# Patient Record
Sex: MALE | Race: WHITE | NOT HISPANIC OR LATINO | Employment: FULL TIME | URBAN - METROPOLITAN AREA
[De-identification: names, ages, dates, MRNs, and addresses within clinical notes are randomized per-mention and may not be internally consistent; named-entity substitution may affect disease eponyms.]

---

## 2018-09-12 ENCOUNTER — OFFICE VISIT (OUTPATIENT)
Dept: FAMILY MEDICINE CLINIC | Facility: CLINIC | Age: 33
End: 2018-09-12
Payer: COMMERCIAL

## 2018-09-12 VITALS
RESPIRATION RATE: 16 BRPM | SYSTOLIC BLOOD PRESSURE: 124 MMHG | HEIGHT: 70 IN | DIASTOLIC BLOOD PRESSURE: 80 MMHG | HEART RATE: 72 BPM | BODY MASS INDEX: 21.05 KG/M2 | TEMPERATURE: 97.8 F | WEIGHT: 147 LBS

## 2018-09-12 DIAGNOSIS — K52.9 CHRONIC DIARRHEA: Primary | ICD-10-CM

## 2018-09-12 DIAGNOSIS — R68.83 CHILLS: ICD-10-CM

## 2018-09-12 DIAGNOSIS — R68.89 GENERALLY UNWELL: ICD-10-CM

## 2018-09-12 DIAGNOSIS — R10.84 GENERALIZED ABDOMINAL PAIN: ICD-10-CM

## 2018-09-12 PROCEDURE — 99203 OFFICE O/P NEW LOW 30 MIN: CPT | Performed by: NURSE PRACTITIONER

## 2018-09-12 RX ORDER — OMEPRAZOLE 40 MG/1
40 CAPSULE, DELAYED RELEASE ORAL DAILY
Qty: 14 CAPSULE | Refills: 0 | Status: SHIPPED | OUTPATIENT
Start: 2018-09-12 | End: 2022-01-04

## 2018-09-12 RX ORDER — CYCLOSPORINE 0.5 MG/ML
1 EMULSION OPHTHALMIC 2 TIMES DAILY
COMMUNITY

## 2018-09-12 RX ORDER — CIPROFLOXACIN 500 MG/1
500 TABLET, FILM COATED ORAL EVERY 12 HOURS SCHEDULED
Qty: 14 TABLET | Refills: 0 | Status: SHIPPED | OUTPATIENT
Start: 2018-09-12 | End: 2018-09-18 | Stop reason: ALTCHOICE

## 2018-09-12 RX ORDER — METRONIDAZOLE 500 MG/1
500 TABLET ORAL EVERY 8 HOURS SCHEDULED
Qty: 21 TABLET | Refills: 0 | Status: SHIPPED | OUTPATIENT
Start: 2018-09-12 | End: 2018-09-19

## 2018-09-12 NOTE — PROGRESS NOTES
Assessment/Plan:    Problem List Items Addressed This Visit     None      Visit Diagnoses     Chronic diarrhea    -  Primary    Relevant Medications    ciprofloxacin (CIPRO) 500 mg tablet    metroNIDAZOLE (FLAGYL) 500 mg tablet    Other Relevant Orders    Stool Enteric Bacterial Panel by PCR    H  pylori antigen, stool    Clostridium difficile toxin by PCR    Generally unwell        Chills        Generalized abdominal pain        Relevant Medications    ciprofloxacin (CIPRO) 500 mg tablet    metroNIDAZOLE (FLAGYL) 500 mg tablet    omeprazole (PriLOSEC) 40 MG capsule        Exam unrevealing  rto for recheck in one week    I have reviewed the  instructions with the patient, answering all questions to his satisfaction  Patient Instructions   Abdominal Pain   AMBULATORY CARE:   Abdominal pain  can be dull, achy, or sharp  You may have pain in one area of your abdomen, or in your entire abdomen  Your pain may be caused by a condition such as constipation, food sensitivity or poisoning, infection, or a blockage  Abdominal pain can also be from a hernia, appendicitis, or an ulcer  Liver, gallbladder, or kidney conditions can also cause abdominal pain  The cause of your abdominal pain may be unknown  Seek care immediately if:   · You have new chest pain or shortness of breath  · You have pulsing pain in your upper abdomen or lower back that suddenly becomes constant  · Your pain is in the right lower abdominal area and worsens with movement  · You have a fever over 100 4°F (38°C) or shaking chills  · You are vomiting and cannot keep food or liquids down  · Your pain does not improve or gets worse over the next 8 to 12 hours  · You see blood in your vomit or bowel movements, or they look black and tarry  · Your skin or the whites of your eyes turn yellow  · You are a woman and have a large amount of vaginal bleeding that is not your monthly period    Contact your healthcare provider if: · You have pain in your lower back  · You are a man and have pain in your testicles  · You have pain when you urinate  · You have questions or concerns about your condition or care  Treatment for abdominal pain  may include medicine to calm your stomach, prevent vomiting, or decrease pain  Follow up with your healthcare provider as directed:  Write down your questions so you remember to ask them during your visits  © 2017 [facility] Information is for End User's use only and may not be sold, redistributed or otherwise used for commercial purposes  All illustrations and images included in CareNotes® are the copyrighted property of A D [employer] M [employer]  or Miguelangel Malik  The above information is an  only  It is not intended as medical advice for individual conditions or treatments  Talk to your doctor, nurse or pharmacist before following any medical regimen to see if it is safe and effective for you  Return in about 1 week (around 9/19/2018)  Subjective:      Patient ID: Rodrigo Brown is a 35 y o  male  Chief Complaint   Patient presents with    Diarrhea     patient reports sxs x 3 weeks since vacation in [facility], [place]       35year old male with no significant history presents with ongoing diarrhea since a trip to [facility], [place] one month ago  Diarrhea several times daily  Watery, brown, not especially foul smelling, no blood  No recent antibiotics  No one else on trip with similar sxs   Denies eating raw or suspicious foods  Cut out alcohol, dairy, caffeine from diet--not helping  Experiences chills from time to time  Feels fatigued  Generally burning in abdomen, denies bloating, nausea, vomiting  Other diet modifications not taking any meds  No h/o c diff, recent antibiotics          The following portions of the patient's history were reviewed and updated as appropriate: allergies, current medications, past family history, past medical history, past social history, past surgical history and problem list     Review of Systems   Constitutional: Positive for chills, fatigue and fever  Negative for unexpected weight change  HENT: Negative  Respiratory: Negative  Cardiovascular: Negative  Gastrointestinal:        Per hpi   Musculoskeletal: Negative for arthralgias and myalgias  Skin: Negative for rash  Neurological: Negative  Current Outpatient Prescriptions   Medication Sig Dispense Refill    cycloSPORINE (RESTASIS) 0 05 % ophthalmic emulsion 1 drop 2 (two) times a day      ciprofloxacin (CIPRO) 500 mg tablet Take 1 tablet (500 mg total) by mouth every 12 (twelve) hours for 7 days 14 tablet 0    metroNIDAZOLE (FLAGYL) 500 mg tablet Take 1 tablet (500 mg total) by mouth every 8 (eight) hours for 7 days 21 tablet 0    omeprazole (PriLOSEC) 40 MG capsule Take 1 capsule (40 mg total) by mouth daily 14 capsule 0     No current facility-administered medications for this visit  Objective:    /80 (BP Location: Left arm, Patient Position: Sitting, Cuff Size: Standard)   Pulse 72   Temp 97 8 °F (36 6 °C) (Tympanic)   Resp 16   Ht 5' 10" (1 778 m)   Wt 66 7 kg (147 lb)   BMI 21 09 kg/m²        Physical Exam   Constitutional: He is oriented to person, place, and time  Vital signs are normal  He appears well-developed and well-nourished  No distress  HENT:   Head: Normocephalic and atraumatic  Mouth/Throat: Oropharynx is clear and moist    Eyes: Conjunctivae and EOM are normal  Pupils are equal, round, and reactive to light  No scleral icterus  Neck: Neck supple  Carotid bruit is not present  Cardiovascular: Normal rate, regular rhythm, normal heart sounds and intact distal pulses  No murmur heard  Pulmonary/Chest: Effort normal and breath sounds normal  No respiratory distress  Abdominal: Soft  Bowel sounds are normal  He exhibits no distension and no abdominal bruit  There is no hepatosplenomegaly   There is no tenderness  There is no rebound and no guarding  Musculoskeletal: He exhibits no edema  Lymphadenopathy:     He has no cervical adenopathy  Neurological: He is alert and oriented to person, place, and time  Skin: Skin is warm and dry  No rash noted  No pallor  Psychiatric: He has a normal mood and affect  Nursing note and vitals reviewed               Jesús Dyson, [address] [place] [address]

## 2018-09-12 NOTE — PATIENT INSTRUCTIONS
Abdominal Pain   AMBULATORY CARE:   Abdominal pain  can be dull, achy, or sharp  You may have pain in one area of your abdomen, or in your entire abdomen  Your pain may be caused by a condition such as constipation, food sensitivity or poisoning, infection, or a blockage  Abdominal pain can also be from a hernia, appendicitis, or an ulcer  Liver, gallbladder, or kidney conditions can also cause abdominal pain  The cause of your abdominal pain may be unknown  Seek care immediately if:   · You have new chest pain or shortness of breath  · You have pulsing pain in your upper abdomen or lower back that suddenly becomes constant  · Your pain is in the right lower abdominal area and worsens with movement  · You have a fever over 100 4°F (38°C) or shaking chills  · You are vomiting and cannot keep food or liquids down  · Your pain does not improve or gets worse over the next 8 to 12 hours  · You see blood in your vomit or bowel movements, or they look black and tarry  · Your skin or the whites of your eyes turn yellow  · You are a woman and have a large amount of vaginal bleeding that is not your monthly period  Contact your healthcare provider if:   · You have pain in your lower back  · You are a man and have pain in your testicles  · You have pain when you urinate  · You have questions or concerns about your condition or care  Treatment for abdominal pain  may include medicine to calm your stomach, prevent vomiting, or decrease pain  Follow up with your healthcare provider as directed:  Write down your questions so you remember to ask them during your visits  © 2017 [address] [employer]  Information is for End User's use only and may not be sold, redistributed or otherwise used for commercial purposes  All illustrations and images included in CareNotes® are the copyrighted property of [employer] Inc  or Miguelangel Malik    The above information is an educational aid only  It is not intended as medical advice for individual conditions or treatments  Talk to your doctor, nurse or pharmacist before following any medical regimen to see if it is safe and effective for you

## 2018-09-18 ENCOUNTER — OFFICE VISIT (OUTPATIENT)
Dept: FAMILY MEDICINE CLINIC | Facility: CLINIC | Age: 33
End: 2018-09-18
Payer: COMMERCIAL

## 2018-09-18 VITALS
RESPIRATION RATE: 16 BRPM | BODY MASS INDEX: 20.9 KG/M2 | SYSTOLIC BLOOD PRESSURE: 120 MMHG | DIASTOLIC BLOOD PRESSURE: 72 MMHG | HEIGHT: 70 IN | HEART RATE: 84 BPM | TEMPERATURE: 98.6 F | WEIGHT: 146 LBS

## 2018-09-18 DIAGNOSIS — K52.9 CHRONIC DIARRHEA: Primary | ICD-10-CM

## 2018-09-18 DIAGNOSIS — Z13.29 THYROID DISORDER SCREEN: ICD-10-CM

## 2018-09-18 DIAGNOSIS — R10.84 GENERALIZED ABDOMINAL PAIN: ICD-10-CM

## 2018-09-18 LAB
C DIFF TOX A+B STL QL IA: NEGATIVE
H PYLORI AG STL QL IA: NEGATIVE
LABCORP COMMENT: NORMAL

## 2018-09-18 PROCEDURE — 36415 COLL VENOUS BLD VENIPUNCTURE: CPT | Performed by: NURSE PRACTITIONER

## 2018-09-18 PROCEDURE — 3008F BODY MASS INDEX DOCD: CPT | Performed by: NURSE PRACTITIONER

## 2018-09-18 PROCEDURE — 99213 OFFICE O/P EST LOW 20 MIN: CPT | Performed by: NURSE PRACTITIONER

## 2018-09-18 PROCEDURE — 1036F TOBACCO NON-USER: CPT | Performed by: NURSE PRACTITIONER

## 2018-09-18 NOTE — PATIENT INSTRUCTIONS
Chronic Diarrhea, Ambulatory Care   GENERAL INFORMATION:   Chronic diarrhea  happens when diarrhea occurs 3 or more times a day for more than 4 weeks  Common symptoms include the following:   · Abdominal pain     · Urgent need to have a bowel movement or loss of bowel control     · Blood, mucous, or pus in your bowel movement    · Weight loss    · Anal irritation and inflammation  Seek immediate care for the following symptoms:   · Thirst and dry skin, mouth, and tongue     · Blood or pus in your bowel movement    · Trouble eating, drinking, or keeping food down    · Severe abdominal pain    · Lightheadedness, weakness, or fainting    · Unusually fast heartbeat or trouble breathing    · Confusion or trouble thinking clearly  Treatment for chronic diarrhea  includes drinking more liquids to replace body fluids lost through diarrhea  You may need to drink an oral rehydration solution (ORS)  An ORS has the right amounts of sugar, salt, and minerals in water to replace body fluids  Medicines may be needed to decrease the amount of diarrhea you are having or to slow down how fast the intestine is moving  Care for chronic diarrhea:   · Do not eat foods that cause your diarrhea  If you know which foods cause your diarrhea, do not eat them  If you do not know what causes your diarrhea, keep a food diary to see if your symptoms are caused by certain foods  Bring this to your follow-up visits  · Drink liquids as directed  You may need to drink extra liquids to prevent dehydration  You may need an ORS  This can be found at most grocery stores or pharmacies  Ask how much liquid to drink each day and which liquids are best for you  · Do not drink or eat foods that contain caffeine or alcohol  These may cause your symptoms to be worse and lead to dehydration  · Wash your hands often  Use soap and water  Germ-killing hand gel is available if you are not near water   Wash your hands after you use the bathroom, change a child's diaper, or sneeze  Wash your hands before you prepare or eat food  Follow up with your healthcare provider as directed:  Write down your questions so you remember to ask them during your visits  CARE AGREEMENT:   You have the right to help plan your care  Learn about your health condition and how it may be treated  Discuss treatment options with your caregivers to decide what care you want to receive  You always have the right to refuse treatment  The above information is an  only  It is not intended as medical advice for individual conditions or treatments  Talk to your doctor, nurse or pharmacist before following any medical regimen to see if it is safe and effective for you  © 2014 [address] is for End User's use only and may not be sold, redistributed or otherwise used for commercial purposes  All illustrations and images included in CareNotes® are the copyrighted property of A D A M , Inc  or Miguelangel Malik

## 2018-09-18 NOTE — PROGRESS NOTES
Assessment/Plan:    Problem List Items Addressed This Visit     None      Visit Diagnoses     Chronic diarrhea    -  Primary    Relevant Orders    Comprehensive metabolic panel    Lipase    Amylase    CBC and Platelet    TSH, 3rd generation with Free T4 reflex    Ambulatory referral to Gastroenterology    Generalized abdominal pain        Relevant Orders    Comprehensive metabolic panel    Lipase    Amylase    CBC and Platelet    Ambulatory referral to Gastroenterology    Thyroid disorder screen        Relevant Orders    TSH, 3rd generation with Free T4 reflex        Referral to gastro  Keep food and symptom diary  Labs drawn this evening will call pt with results    I have reviewed the  instructions with the patient, answering all questions to his satisfaction  Patient Instructions   Chronic Diarrhea, Ambulatory Care   GENERAL INFORMATION:   Chronic diarrhea  happens when diarrhea occurs 3 or more times a day for more than 4 weeks  Common symptoms include the following:   · Abdominal pain     · Urgent need to have a bowel movement or loss of bowel control     · Blood, mucous, or pus in your bowel movement    · Weight loss    · Anal irritation and inflammation  Seek immediate care for the following symptoms:   · Thirst and dry skin, mouth, and tongue     · Blood or pus in your bowel movement    · Trouble eating, drinking, or keeping food down    · Severe abdominal pain    · Lightheadedness, weakness, or fainting    · Unusually fast heartbeat or trouble breathing    · Confusion or trouble thinking clearly  Treatment for chronic diarrhea  includes drinking more liquids to replace body fluids lost through diarrhea  You may need to drink an oral rehydration solution (ORS)  An ORS has the right amounts of sugar, salt, and minerals in water to replace body fluids  Medicines may be needed to decrease the amount of diarrhea you are having or to slow down how fast the intestine is moving    Care for chronic diarrhea: · Do not eat foods that cause your diarrhea  If you know which foods cause your diarrhea, do not eat them  If you do not know what causes your diarrhea, keep a food diary to see if your symptoms are caused by certain foods  Bring this to your follow-up visits  · Drink liquids as directed  You may need to drink extra liquids to prevent dehydration  You may need an ORS  This can be found at most grocery stores or pharmacies  Ask how much liquid to drink each day and which liquids are best for you  · Do not drink or eat foods that contain caffeine or alcohol  These may cause your symptoms to be worse and lead to dehydration  · Wash your hands often  Use soap and water  Germ-killing hand gel is available if you are not near water  Wash your hands after you use the bathroom, change a child's diaper, or sneeze  Wash your hands before you prepare or eat food  Follow up with your healthcare provider as directed:  Write down your questions so you remember to ask them during your visits  CARE AGREEMENT:   You have the right to help plan your care  Learn about your health condition and how it may be treated  Discuss treatment options with your caregivers to decide what care you want to receive  You always have the right to refuse treatment  The above information is an  only  It is not intended as medical advice for individual conditions or treatments  Talk to your doctor, nurse or pharmacist before following any medical regimen to see if it is safe and effective for you  © 2014 [address] is for End User's use only and may not be sold, redistributed or otherwise used for commercial purposes  All illustrations and images included in CareNotes® are the copyrighted property of A [employer] A M [employer] Inc  or Miguelangel Patel in about 3 months (around 12/18/2018)  Subjective:      Patient ID: River Balderas is a 35 y o  male      Chief Complaint   Patient presents with    Follow-up       35year old male with no significant history presents with ongoing diarrhea since a trip to [facility], [place] one month ago  Diarrhea several times daily  Watery, brown, not especially foul smelling, no blood  No recent antibiotics  No one else on trip with similar sxs  Denies eating raw or suspicious foods  Cut out alcohol, dairy, caffeine from diet--not helping  Experiences chills from time to time  Feels fatigued  Generally burning in abdomen, denies bloating, nausea, vomiting  Other diet modifications not taking any meds  No better after flagyl  Stop cipro after 1 day d/t nausea  Stool for cdiff, h pylori negative  Uncle report h/o chrons        The following portions of the patient's history were reviewed and updated as appropriate: allergies, current medications, past family history, past medical history, past social history, past surgical history and problem list     Review of Systems   Constitutional: Positive for chills, fatigue and fever  Negative for unexpected weight change  HENT: Negative  Respiratory: Negative  Cardiovascular: Negative  Gastrointestinal:        Per hpi   Musculoskeletal: Negative for arthralgias and myalgias  Skin: Negative for rash  Neurological: Negative  Current Outpatient Prescriptions   Medication Sig Dispense Refill    cycloSPORINE (RESTASIS) 0 05 % ophthalmic emulsion 1 drop 2 (two) times a day      metroNIDAZOLE (FLAGYL) 500 mg tablet Take 1 tablet (500 mg total) by mouth every 8 (eight) hours for 7 days 21 tablet 0    omeprazole (PriLOSEC) 40 MG capsule Take 1 capsule (40 mg total) by mouth daily 14 capsule 0     No current facility-administered medications for this visit          Objective:    /72 (BP Location: Left arm, Patient Position: Sitting, Cuff Size: Adult)   Pulse 84   Temp 98 6 °F (37 °C) (Temporal)   Resp 16   Ht 5' 10" (1 778 m)   Wt 66 2 kg (146 lb)   BMI 20 95 kg/m²        Physical Exam Constitutional: He is oriented to person, place, and time  Vital signs are normal  He appears well-developed and well-nourished  No distress  HENT:   Head: Normocephalic and atraumatic  Mouth/Throat: Oropharynx is clear and moist    Eyes: Conjunctivae and EOM are normal  Pupils are equal, round, and reactive to light  No scleral icterus  Neck: Neck supple  Carotid bruit is not present  Cardiovascular: Normal rate, regular rhythm, normal heart sounds and intact distal pulses  No murmur heard  Pulmonary/Chest: Effort normal and breath sounds normal  No respiratory distress  Abdominal: Soft  Bowel sounds are normal  He exhibits no distension and no abdominal bruit  There is no hepatosplenomegaly  There is no tenderness  There is no rebound and no guarding  Musculoskeletal: He exhibits no edema  Lymphadenopathy:     He has no cervical adenopathy  Neurological: He is alert and oriented to person, place, and time  Skin: Skin is warm and dry  No rash noted  No pallor  Psychiatric: He has a normal mood and affect  Nursing note and vitals reviewed               Ezra Donnelly, [address] [place] [address]

## 2018-09-20 ENCOUNTER — TELEPHONE (OUTPATIENT)
Dept: FAMILY MEDICINE CLINIC | Facility: CLINIC | Age: 33
End: 2018-09-20

## 2018-09-20 LAB
ALBUMIN SERPL-MCNC: 5 G/DL (ref 3.5–5.5)
ALBUMIN/GLOB SERPL: 2 {RATIO} (ref 1.2–2.2)
ALP SERPL-CCNC: 44 IU/L (ref 39–117)
ALT SERPL-CCNC: 19 IU/L (ref 0–44)
AMYLASE SERPL-CCNC: 69 U/L (ref 31–124)
AST SERPL-CCNC: 29 IU/L (ref 0–40)
BILIRUB SERPL-MCNC: 0.5 MG/DL (ref 0–1.2)
BUN SERPL-MCNC: 12 MG/DL (ref 6–20)
BUN/CREAT SERPL: 11 (ref 9–20)
CALCIUM SERPL-MCNC: 9.8 MG/DL (ref 8.7–10.2)
CHLORIDE SERPL-SCNC: 103 MMOL/L (ref 96–106)
CO2 SERPL-SCNC: 20 MMOL/L (ref 20–29)
CREAT SERPL-MCNC: 1.11 MG/DL (ref 0.76–1.27)
ERYTHROCYTE [DISTWIDTH] IN BLOOD BY AUTOMATED COUNT: 13.5 % (ref 12.3–15.4)
GLOBULIN SER-MCNC: 2.5 G/DL (ref 1.5–4.5)
GLUCOSE SERPL-MCNC: 80 MG/DL (ref 65–99)
HCT VFR BLD AUTO: 45.4 % (ref 37.5–51)
HGB BLD-MCNC: 15.2 G/DL (ref 13–17.7)
LIPASE SERPL-CCNC: 37 U/L (ref 13–78)
MCH RBC QN AUTO: 29.2 PG (ref 26.6–33)
MCHC RBC AUTO-ENTMCNC: 33.5 G/DL (ref 31.5–35.7)
MCV RBC AUTO: 87 FL (ref 79–97)
PLATELET # BLD AUTO: 221 X10E3/UL (ref 150–379)
POTASSIUM SERPL-SCNC: 4 MMOL/L (ref 3.5–5.2)
PROT SERPL-MCNC: 7.5 G/DL (ref 6–8.5)
RBC # BLD AUTO: 5.21 X10E6/UL (ref 4.14–5.8)
SL AMB EGFR AFRICAN AMERICAN: 100 ML/MIN/1.73
SL AMB EGFR NON AFRICAN AMERICAN: 87 ML/MIN/1.73
SODIUM SERPL-SCNC: 142 MMOL/L (ref 134–144)
TSH SERPL DL<=0.005 MIU/L-ACNC: 2.45 UIU/ML (ref 0.45–4.5)
WBC # BLD AUTO: 8.4 X10E3/UL (ref 3.4–10.8)

## 2018-09-20 NOTE — TELEPHONE ENCOUNTER
----- Message from 3Emaira Beaulieu [facility] sent at 9/20/2018  2:47 PM EDT -----  Blood work is acceptable  Follow up as scheduled

## 2020-02-14 ENCOUNTER — OFFICE VISIT (OUTPATIENT)
Dept: URGENT CARE | Facility: CLINIC | Age: 35
End: 2020-02-14
Payer: COMMERCIAL

## 2020-02-14 VITALS
WEIGHT: 140 LBS | OXYGEN SATURATION: 100 % | SYSTOLIC BLOOD PRESSURE: 131 MMHG | DIASTOLIC BLOOD PRESSURE: 79 MMHG | HEIGHT: 70 IN | HEART RATE: 79 BPM | RESPIRATION RATE: 18 BRPM | TEMPERATURE: 97.9 F | BODY MASS INDEX: 20.04 KG/M2

## 2020-02-14 DIAGNOSIS — J11.1 INFLUENZA: Primary | ICD-10-CM

## 2020-02-14 PROCEDURE — 99213 OFFICE O/P EST LOW 20 MIN: CPT | Performed by: PHYSICIAN ASSISTANT

## 2020-02-14 PROCEDURE — 1036F TOBACCO NON-USER: CPT | Performed by: PHYSICIAN ASSISTANT

## 2020-02-14 PROCEDURE — 3008F BODY MASS INDEX DOCD: CPT | Performed by: PHYSICIAN ASSISTANT

## 2020-02-14 RX ORDER — ONDANSETRON 4 MG/1
4 TABLET, FILM COATED ORAL EVERY 8 HOURS PRN
Qty: 20 TABLET | Refills: 0 | Status: SHIPPED | OUTPATIENT
Start: 2020-02-14 | End: 2022-01-04

## 2020-02-14 RX ORDER — OSELTAMIVIR PHOSPHATE 75 MG/1
75 CAPSULE ORAL 2 TIMES DAILY
Qty: 10 CAPSULE | Refills: 0 | Status: SHIPPED | OUTPATIENT
Start: 2020-02-14 | End: 2020-02-19

## 2020-02-14 NOTE — PATIENT INSTRUCTIONS
Most likely influenza, discussed send out test, patient declined  Rx tamiflu twice daily x 5 days sent via EMR  Rx zofran sent via EMR as needed for nausea/vomiting related to tamiflu  Recommend tylenol/ibuprofen as needed for pain/fever  Rest, fluids, and supportive care  Follow up with PCP in 3-5 days  Proceed to  ER if symptoms worsen  Influenza   WHAT YOU NEED TO KNOW:   Influenza (the flu) is an infection caused by the influenza virus  The flu is easily spread when an infected person coughs, sneezes, or has close contact with others  You may be able to spread the flu to others for 1 week or longer after signs or symptoms appear  DISCHARGE INSTRUCTIONS:   Call 911 for any of the following:   · You have trouble breathing, and your lips look purple or blue  · You have a seizure  Return to the emergency department if:   · You are dizzy, or you are urinating less or not at all  · You have a headache with a stiff neck, and you feel tired or confused  · You have new pain or pressure in your chest     · Your symptoms, such as shortness of breath, vomiting, or diarrhea, get worse  · Your symptoms, such as fever and coughing, seem to get better, but then get worse  Contact your healthcare provider if:   · You have new muscle pain or weakness  · You have questions or concerns about your condition or care  Medicines: You may need any of the following:  · Acetaminophen  decreases pain and fever  It is available without a doctor's order  Ask how much to take and how often to take it  Follow directions  Acetaminophen can cause liver damage if not taken correctly  · NSAIDs , such as ibuprofen, help decrease swelling, pain, and fever  This medicine is available with or without a doctor's order  NSAIDs can cause stomach bleeding or kidney problems in certain people  If you take blood thinner medicine, always ask your healthcare provider if NSAIDs are safe for you   Always read the medicine label and follow directions  · Antivirals  help fight a viral infection  · Take your medicine as directed  Contact your healthcare provider if you think your medicine is not helping or if you have side effects  Tell him or her if you are allergic to any medicine  Keep a list of the medicines, vitamins, and herbs you take  Include the amounts, and when and why you take them  Bring the list or the pill bottles to follow-up visits  Carry your medicine list with you in case of an emergency  Rest  as much as you can to help you recover  Drink liquids as directed  to help prevent dehydration  Ask how much liquid to drink each day and which liquids are best for you  Prevent the spread of influenza:   · Wash your hands often  Use soap and water  Wash your hands after you use the bathroom, change a child's diapers, or sneeze  Wash your hands before you prepare or eat food  Use gel hand cleanser when soap and water are not available  Do not touch your eyes, nose, or mouth unless you have washed your hands first            · Cover your mouth when you sneeze or cough  Cough into a tissue or the bend of your arm  · Clean shared items with a germ-killing   Clean table surfaces, doorknobs, and light switches  Do not share towels, silverware, and dishes with people who are sick  Wash bed sheets, towels, silverware, and dishes with soap and water  · Wear a mask  over your mouth and nose if you are sick or are near anyone who is sick  · Stay away from others  if you are sick  · Influenza vaccine  helps prevent influenza (flu)  Everyone older than 6 months should get a yearly influenza vaccine  Get the vaccine as soon as it is available, usually in September or October each year  Follow up with your healthcare provider as directed:  Write down your questions so you remember to ask them during your visits     © 2017 [facility] Information is for End User's use only and may not be sold, redistributed or otherwise used for commercial purposes  All illustrations and images included in CareNotes® are the copyrighted property of A D A M [employer] Inc  or Miguelangel Malik  The above information is an  only  It is not intended as medical advice for individual conditions or treatments  Talk to your doctor, nurse or pharmacist before following any medical regimen to see if it is safe and effective for you

## 2020-02-14 NOTE — PROGRESS NOTES
[facility]        NAME: Austin Torres is a 29 y o  male  : 1985    MRN: [id number]  DATE: 2020  TIME: 9:51 AM    Assessment and Plan   Influenza [J11 1]  1  Influenza  oseltamivir (TAMIFLU) 75 mg capsule    ondansetron (ZOFRAN) 4 mg tablet         Patient Instructions   Most likely influenza, discussed send out test, patient declined  Rx tamiflu twice daily x 5 days sent via EMR  Rx zofran sent via EMR as needed for nausea/vomiting related to tamiflu  Recommend tylenol/ibuprofen as needed for pain/fever  Rest, fluids, and supportive care  Follow up with PCP in 3-5 days  Proceed to  ER if symptoms worsen  Chief Complaint     Chief Complaint   Patient presents with    Generalized Body Aches     joint pain, fever, fatigue x2 days  Taking OTC tylenol flu and cold          History of Present Illness       Rosa Capps is a 28 y/o male who presents to the clinic c/o flu like symptoms x 2 days  Patient states that a co-worker was recently diagnosed with influenza B and yesterday he started with joint and muscle pain, along with nasal congestion, cough, and sore throat  He also notes a fever last night of 101 degrees F  He took tylenol cold and flu last night but nothing today  He denies any current fever, chills, ear pain, sinus pressure, SOB or chest pain  His wife is 37 wks pregnant and concerned about he catching the flu  Review of Systems   Review of Systems   Constitutional: Positive for fatigue  Negative for chills and fever  HENT: Positive for congestion and sore throat  Negative for ear pain, sinus pressure and sinus pain  Respiratory: Positive for cough  Negative for chest tightness and shortness of breath  Cardiovascular: Negative for chest pain  Gastrointestinal: Negative for diarrhea, nausea and vomiting  Musculoskeletal: Positive for arthralgias and myalgias  Neurological: Positive for headaches  Negative for dizziness and light-headedness       Current Medications       Current Outpatient Medications:     cycloSPORINE (RESTASIS) 0 05 % ophthalmic emulsion, 1 drop 2 (two) times a day, Disp: , Rfl:     omeprazole (PriLOSEC) 40 MG capsule, Take 1 capsule (40 mg total) by mouth daily (Patient not taking: Reported on 2/14/2020), Disp: 14 capsule, Rfl: 0    ondansetron (ZOFRAN) 4 mg tablet, Take 1 tablet (4 mg total) by mouth every 8 (eight) hours as needed for nausea or vomiting, Disp: 20 tablet, Rfl: 0    oseltamivir (TAMIFLU) 75 mg capsule, Take 1 capsule (75 mg total) by mouth 2 (two) times a day for 5 days, Disp: 10 capsule, Rfl: 0    Current Allergies     Allergies as of 02/14/2020 - Reviewed 02/14/2020   Allergen Reaction Noted    Ciprofloxacin GI Intolerance 09/18/2018            The following portions of the patient's history were reviewed and updated as appropriate: allergies, current medications, past family history, past medical history, past social history, past surgical history and problem list      History reviewed  No pertinent past medical history  Past Surgical History:   Procedure Laterality Date    WRIST SURGERY         Family History   Problem Relation Age of Onset    No Known Problems Mother     No Known Problems Father          Medications have been verified  Objective   /79   Pulse 79   Temp 97 9 °F (36 6 °C)   Resp 18   Ht 5' 10" (1 778 m)   Wt 63 5 kg (140 lb)   SpO2 100%   BMI 20 09 kg/m²        Physical Exam     Physical Exam   Constitutional: He is oriented to person, place, and time  He appears well-developed and well-nourished  No distress  HENT:   Right Ear: Tympanic membrane, external ear and ear canal normal    Left Ear: Tympanic membrane, external ear and ear canal normal    Nose: Mucosal edema present  Right sinus exhibits no maxillary sinus tenderness and no frontal sinus tenderness  Left sinus exhibits no maxillary sinus tenderness and no frontal sinus tenderness     Mouth/Throat: Uvula is midline and mucous membranes are normal  Posterior oropharyngeal erythema (mild) present  No oropharyngeal exudate or posterior oropharyngeal edema  Tonsils are 0 on the right  Tonsils are 0 on the left  No tonsillar exudate  Cardiovascular: Normal rate, regular rhythm and normal heart sounds  Pulmonary/Chest: Effort normal and breath sounds normal    Lymphadenopathy:     He has no cervical adenopathy  Neurological: He is alert and oriented to person, place, and time  Psychiatric: He has a normal mood and affect  Nursing note and vitals reviewed

## 2021-07-12 ENCOUNTER — OFFICE VISIT (OUTPATIENT)
Dept: URGENT CARE | Facility: MEDICAL CENTER | Age: 36
End: 2021-07-12
Payer: COMMERCIAL

## 2021-07-12 VITALS
RESPIRATION RATE: 20 BRPM | WEIGHT: 156 LBS | BODY MASS INDEX: 22.33 KG/M2 | OXYGEN SATURATION: 100 % | HEART RATE: 91 BPM | TEMPERATURE: 98.6 F | SYSTOLIC BLOOD PRESSURE: 141 MMHG | DIASTOLIC BLOOD PRESSURE: 75 MMHG | HEIGHT: 70 IN

## 2021-07-12 DIAGNOSIS — S80.862A INSECT BITE OF LEFT LOWER EXTREMITY, INITIAL ENCOUNTER: Primary | ICD-10-CM
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
## 2021-07-12 PROCEDURE — 99213 OFFICE O/P EST LOW 20 MIN: CPT | Performed by: PHYSICIAN ASSISTANT

## 2021-07-12 RX ORDER — CEPHALEXIN 500 MG/1
500 CAPSULE ORAL EVERY 8 HOURS SCHEDULED
Qty: 21 CAPSULE | Refills: 0 | Status: SHIPPED | OUTPATIENT
Start: 2021-07-12 | End: 2021-07-15 | Stop reason: ALTCHOICE

## 2021-07-12 NOTE — PATIENT INSTRUCTIONS
Insect Bite or Sting   WHAT YOU NEED TO KNOW:   Most insect bites and stings are not dangerous and go away without treatment  Your symptoms may be mild, or you may develop anaphylaxis  Anaphylaxis is a sudden, life-threatening reaction that needs immediate treatment  Common examples of insects that bite or sting are bees, ticks, mosquitoes, spiders, and ants  Insect bites or stings can lead to diseases such as malaria, West Nile virus, Lyme disease, or [place] Spotted Fever  DISCHARGE INSTRUCTIONS:   Call 911 for signs or symptoms of anaphylaxis,  such as trouble breathing, swelling in your mouth or throat, or wheezing  You may also have itching, a rash, hives, or feel like you are going to faint  Return to the emergency department if:   · You are stung on your tongue or in your throat  · A white area forms around the bite  · You are sweating badly or have body pain  · You think you were bitten or stung by a poisonous insect  Contact your healthcare provider if:   · You have a fever  · The area becomes red, warm, tender, and swollen beyond the area of the bite or sting  · You have questions or concerns about your condition or care  Medicines:   · Antihistamines  decrease itching and rash  · Epinephrine  is used to treat severe allergic reactions such as anaphylaxis  · Take your medicine as directed  Contact your healthcare provider if you think your medicine is not helping or if you have side effects  Tell him of her if you are allergic to any medicine  Keep a list of the medicines, vitamins, and herbs you take  Include the amounts, and when and why you take them  Bring the list or the pill bottles to follow-up visits  Carry your medicine list with you in case of an emergency  Steps to take for signs or symptoms of anaphylaxis:   · Immediately  give 1 shot of epinephrine only into the outer thigh muscle  · Leave the shot in place  as directed   Your healthcare provider may recommend you leave it in place for up to 10 seconds before you remove it  This helps make sure all of the epinephrine is delivered  · Call 911 and go to the emergency department,  even if the shot improved symptoms  Do not drive yourself  Bring the used epinephrine shot with you  Safety precautions to take if you are at risk for anaphylaxis:   · Keep 2 shots of epinephrine with you at all times  You may need a second shot, because epinephrine only works for about 20 minutes and symptoms may return  Your healthcare provider can show you and family members how to give the shot  Check the expiration date every month and replace it before it expires  · Create an action plan  Your healthcare provider can help you create a written plan that explains the allergy and an emergency plan to treat a reaction  The plan explains when to give a second epinephrine shot if symptoms return or do not improve after the first  Give copies of the action plan and emergency instructions to family members, work and school staff, and  providers  Show them how to give a shot of epinephrine  · Carry medical alert identification  Wear medical alert jewelry or carry a card that says you have an insect allergy  Ask your healthcare provider where to get these items  If an insect bites or stings you:   · Remove the stinger  Scrape the stinger out with your fingernail, edge of a credit card, or a knife blade  Do not squeeze the wound  Gently wash the area with soap and water  · Remove the tick  Ticks must be removed as soon as possible so you do not get diseases passed through tick bites  Ask your healthcare provider for more information on tick bites and how to remove ticks  Care for a bite or sting wound:   · Elevate the affected area  Prop the wound above the level of your heart, if possible  Elevate the area for 10 to 20 minutes each hour or as directed by your healthcare provider  · Use compresses  Soak a clean washcloth in cold water, wring it out, and put it on the bite or sting  Use the compress for 10 to 20 minutes each hour or as directed by your healthcare provider  After 24 to 48 hours, change to warm compresses  · Apply a paste  Add water to baking soda to make a thick paste  Put the paste on the area for 5 minutes  Rinse gently to remove the paste  Prevent another insect bite or sting:   · Do not wear bright-colored or flower-print clothing when you plan to spend time outdoors  Do not use hairspray, perfumes, or aftershave  · Do not leave food out  · Empty any standing water and wash container with soap and water every 2 days  · Put screens on all open windows and doors  · Put insect repellent that contains DEET on skin that is showing when you go outside  Put insect repellent at the top of your boots, bottom of pant legs, and sleeve cuffs  Wear long sleeves, pants, and shoes  · Use citronella candles outdoors to help keep mosquitoes away  Put a tick and flea collar on pets  Follow up with your healthcare provider as directed:  Write down your questions so you remember to ask them during your visits  © Copyright [address] Information is for End User's use only and may not be sold, redistributed or otherwise used for commercial purposes  All illustrations and images included in CareNotes® are the copyrighted property of [employer]  or [employer]   The above information is an  only  It is not intended as medical advice for individual conditions or treatments  Talk to your doctor, nurse or pharmacist before following any medical regimen to see if it is safe and effective for you

## 2021-07-12 NOTE — PROGRESS NOTES
[facility]        NAME: Kerry Lee is a 28 y o  male  : 1985    MRN: [id number]  DATE: 2021  TIME: 10:27 AM    Assessment and Plan   Insect bite of left lower extremity, initial encounter [O74 204P, W57  XXXA]  1  Insect bite of left lower extremity, initial encounter  cephalexin (KEFLEX) 500 mg capsule         Patient Instructions       Follow up with PCP in 3-5 days  Proceed to  ER if symptoms worsen  Chief Complaint     Chief Complaint   Patient presents with   [address]     left thigh insect bite 7 days ago  red, swollen, itchy and painful         History of Present Illness        Patient presents with an insect bite to his left thigh that started about 7 days ago  He states the redness is getting worse  He denies any fevers, chills, nausea, vomiting  Review of Systems   Review of Systems   Constitutional: Negative  HENT: Negative  Respiratory: Negative  Cardiovascular: Negative  Gastrointestinal: Negative  Musculoskeletal: Negative  Skin: Positive for wound  Neurological: Negative  Psychiatric/Behavioral: Negative            Current Medications       Current Outpatient Medications:     cycloSPORINE (RESTASIS) 0 05 % ophthalmic emulsion, 1 drop 2 (two) times a day, Disp: , Rfl:     cephalexin (KEFLEX) 500 mg capsule, Take 1 capsule (500 mg total) by mouth every 8 (eight) hours for 7 days, Disp: 21 capsule, Rfl: 0    omeprazole (PriLOSEC) 40 MG capsule, Take 1 capsule (40 mg total) by mouth daily (Patient not taking: Reported on 2020), Disp: 14 capsule, Rfl: 0    ondansetron (ZOFRAN) 4 mg tablet, Take 1 tablet (4 mg total) by mouth every 8 (eight) hours as needed for nausea or vomiting (Patient not taking: Reported on 2021), Disp: 20 tablet, Rfl: 0    Current Allergies     Allergies as of 2021 - Reviewed 2021   Allergen Reaction Noted    Ciprofloxacin GI Intolerance 2018            The following portions of the patient's history were reviewed and updated as appropriate: allergies, current medications, past family history, past medical history, past social history, past surgical history and problem list      History reviewed  No pertinent past medical history  Past Surgical History:   Procedure Laterality Date    WRIST SURGERY         Family History   Problem Relation Age of Onset    No Known Problems Mother     No Known Problems Father          Medications have been verified  Objective   /75   Pulse 91   Temp 98 6 °F (37 °C) (Temporal)   Resp 20   Ht 5' 10" (1 778 m)   Wt 70 8 kg (156 lb)   SpO2 100%   BMI 22 38 kg/m²        Physical Exam     Physical Exam  Vitals and nursing note reviewed  Constitutional:       General: He is not in acute distress  Appearance: Normal appearance  He is not ill-appearing, toxic-appearing or diaphoretic  HENT:      Head: Normocephalic and atraumatic  Cardiovascular:      Rate and Rhythm: Normal rate and regular rhythm  Pulses: Normal pulses  Heart sounds: Normal heart sounds  Pulmonary:      Effort: Pulmonary effort is normal       Breath sounds: Normal breath sounds  Skin:     Capillary Refill: Capillary refill takes less than 2 seconds  Findings: Rash present  Comments:   Patient may by 3 cm circular area of erythema on the left lateral thigh  No active drainage  No bull's eye rash  Neurological:      General: No focal deficit present  Mental Status: He is alert and oriented to person, place, and time     Psychiatric:         Mood and Affect: Mood normal          Behavior: Behavior normal

## 2021-07-15 ENCOUNTER — TELEPHONE (OUTPATIENT)
Dept: URGENT CARE | Facility: CLINIC | Age: 36
End: 2021-07-15

## 2021-07-15 NOTE — TELEPHONE ENCOUNTER
I returned call from patient  He was seen on 07/12/2021 at [facility] for insect bite to his left lower leg  He was placed on cephalexin but reports that he has drawn a line around the area of redness and it appears to be getting larger  He is concerned that it could have possibly been a tick bite that this could be acute Lyme disease  I did not personally examine the patient  I reviewed office visit note from 07/12/2021  I will switch him from Keflex to doxycycline which would cover him for both cellulitis and acute Lyme disease  If his condition continues to persist or worsen, then he will need re-evaluation  He voiced understanding and agreed with plan

## 2022-01-04 ENCOUNTER — OFFICE VISIT (OUTPATIENT)
Dept: URGENT CARE | Facility: CLINIC | Age: 37
End: 2022-01-04
Payer: COMMERCIAL

## 2022-01-04 VITALS
OXYGEN SATURATION: 99 % | HEIGHT: 70 IN | WEIGHT: 162.4 LBS | HEART RATE: 80 BPM | TEMPERATURE: 98.3 F | DIASTOLIC BLOOD PRESSURE: 80 MMHG | SYSTOLIC BLOOD PRESSURE: 130 MMHG | RESPIRATION RATE: 18 BRPM | BODY MASS INDEX: 23.25 KG/M2

## 2022-01-04 DIAGNOSIS — H93.8X2 SENSATION OF FULLNESS IN LEFT EAR: Primary | ICD-10-CM

## 2022-01-04 PROCEDURE — 99213 OFFICE O/P EST LOW 20 MIN: CPT | Performed by: PHYSICIAN ASSISTANT

## 2022-01-04 RX ORDER — DIAPER,BRIEF,INFANT-TODD,DISP
EACH MISCELLANEOUS DAILY
COMMUNITY

## 2022-01-04 NOTE — PROGRESS NOTES
[facility]        NAME: Rafael Bauman is a 39 y o  male  : 1985    MRN: [id number]  DATE: 2022  TIME: 4:27 PM    Assessment and Plan   Sensation of fullness in left ear [H93 8X2]  1  Sensation of fullness in left ear  Ambulatory Referral to Otolaryngology         Patient Instructions   Left ear fullness  Normal exam  Recommend decongestant and/or flonase nasal spray  Ref to ENT given    Follow up with PCP in 3-5 days  Proceed to  ER if symptoms worsen  Chief Complaint     Chief Complaint   Patient presents with    Ear Fullness     x 1 year - has had L ear discomfort  Noted yesterday to be different - feels ticlish with decreaseed hearing  No drainage/URI or allergy s/s  No OTC meds tried  History of Present Illness       Chevy Katherine is a 43-year-old male who presents to clinic complaining of left ear pain and fullness x1 year  He states the discomfort and fullness has been intermittent however yesterday his seem to be more constant and more is with typical inside his ear  Denies any discharge or bleeding from the ear  He has not seen an ENT and denies any history of seasonal allergies  He denies any fevers chills  He denies any nasal congestion or rhinorrhea  He denies any sore throat  Review of Systems   Review of Systems   Constitutional: Negative for chills and fever  HENT: Positive for ear pain  Negative for congestion, ear discharge, rhinorrhea, sinus pressure, sinus pain and sore throat  Respiratory: Negative for cough  Neurological: Negative for dizziness, light-headedness and headaches           Current Medications       Current Outpatient Medications:     cycloSPORINE (RESTASIS) 0 05 % ophthalmic emulsion, 1 drop 2 (two) times a day, Disp: , Rfl:     hydrocortisone 1 % cream, Apply topically in the morning, Disp: , Rfl:     Multiple Vitamin (ONE-A-DAY MENS PO), Take by mouth in the morning, Disp: , Rfl:     Current Allergies     Allergies as of 01/04/2022 - Reviewed 01/04/2022   Allergen Reaction Noted    Ciprofloxacin GI Intolerance 09/18/2018            The following portions of the patient's history were reviewed and updated as appropriate: allergies, current medications, past family history, past medical history, past social history, past surgical history and problem list      Past Medical History:   Diagnosis Date    Dry eye syndrome of both eyes     Eczema        Past Surgical History:   Procedure Laterality Date    WRIST SURGERY Left     ganglion cyst       Family History   Problem Relation Age of Onset    No Known Problems Mother     No Known Problems Father          Medications have been verified  Objective   /80   Pulse 80   Temp 98 3 °F (36 8 °C)   Resp 18   Ht 5' 10" (1 778 m)   Wt 73 7 kg (162 lb 6 4 oz)   SpO2 99%   BMI 23 30 kg/m²   No LMP for male patient  Physical Exam     Physical Exam  Vitals and nursing note reviewed  Constitutional:       General: He is not in acute distress  Appearance: Normal appearance  He is not ill-appearing  HENT:      Right Ear: Tympanic membrane, ear canal and external ear normal  There is no impacted cerumen  Left Ear: Tympanic membrane, ear canal and external ear normal  There is no impacted cerumen  Nose: Nose normal       Mouth/Throat:      Mouth: Mucous membranes are moist       Pharynx: No oropharyngeal exudate or posterior oropharyngeal erythema  Cardiovascular:      Rate and Rhythm: Normal rate and regular rhythm  Heart sounds: Normal heart sounds  Pulmonary:      Effort: Pulmonary effort is normal       Breath sounds: Normal breath sounds  Neurological:      Mental Status: He is alert and oriented to person, place, and time     Psychiatric:         Mood and Affect: Mood normal          Behavior: Behavior normal

## 2022-01-11 ENCOUNTER — OFFICE VISIT (OUTPATIENT)
Dept: OTOLARYNGOLOGY | Facility: CLINIC | Age: 37
End: 2022-01-11
Payer: COMMERCIAL

## 2022-01-11 ENCOUNTER — OFFICE VISIT (OUTPATIENT)
Dept: AUDIOLOGY | Facility: CLINIC | Age: 37
End: 2022-01-11
Payer: COMMERCIAL

## 2022-01-11 VITALS — HEIGHT: 70 IN | WEIGHT: 155 LBS | BODY MASS INDEX: 22.19 KG/M2 | TEMPERATURE: 98.4 F

## 2022-01-11 DIAGNOSIS — H93.A2 PULSATILE TINNITUS OF LEFT EAR: ICD-10-CM

## 2022-01-11 DIAGNOSIS — H93.8X2 SENSATION OF FULLNESS IN LEFT EAR: ICD-10-CM

## 2022-01-11 DIAGNOSIS — H69.82 ETD (EUSTACHIAN TUBE DYSFUNCTION), LEFT: Primary | ICD-10-CM

## 2022-01-11 DIAGNOSIS — H90.3 SENSORY HEARING LOSS, BILATERAL: Primary | ICD-10-CM

## 2022-01-11 DIAGNOSIS — J35.8 TONSILLITH: ICD-10-CM

## 2022-01-11 DIAGNOSIS — H60.8X2 CHRONIC REACTIVE OTITIS EXTERNA OF LEFT EAR: ICD-10-CM

## 2022-01-11 DIAGNOSIS — H93.8X2 PRESSURE SENSATION IN LEFT EAR: ICD-10-CM

## 2022-01-11 DIAGNOSIS — M26.609 TMJ (TEMPOROMANDIBULAR JOINT SYNDROME): ICD-10-CM

## 2022-01-11 PROBLEM — H69.92 ETD (EUSTACHIAN TUBE DYSFUNCTION), LEFT: Status: ACTIVE | Noted: 2022-01-11

## 2022-01-11 PROCEDURE — 92557 COMPREHENSIVE HEARING TEST: CPT | Performed by: AUDIOLOGIST

## 2022-01-11 PROCEDURE — 92567 TYMPANOMETRY: CPT | Performed by: AUDIOLOGIST

## 2022-01-11 PROCEDURE — 99204 OFFICE O/P NEW MOD 45 MIN: CPT | Performed by: NURSE PRACTITIONER

## 2022-01-11 RX ORDER — OFLOXACIN 3 MG/ML
4 SOLUTION AURICULAR (OTIC) 2 TIMES DAILY
Qty: 10 ML | Refills: 1 | Status: SHIPPED | OUTPATIENT
Start: 2022-01-11 | End: 2022-01-18

## 2022-01-11 RX ORDER — OFLOXACIN 3 MG/ML
4 SOLUTION AURICULAR (OTIC) 2 TIMES DAILY
Qty: 10 ML | Refills: 1 | Status: SHIPPED | OUTPATIENT
Start: 2022-01-11 | End: 2022-01-11

## 2022-01-11 NOTE — ASSESSMENT & PLAN NOTE
Audiogram today negative for findings, normal bilateral hearing  Tymps type A bilaterally  Reviewed nature of pulsatile tinnitus  Discussed risk of intracranial HTN vs primary HTN due to pulsatile tinnitus  This may also be associated with musculoskeletal changes found with TMJ syndrome  Recommend and he agreed to monitor blood pressure  Random checks throughout the day  Start Flonase nasal spray to address ETD  If pulsing persists despite Flonase and other interventions, then consider CTA head and neck rule out intracranial HTN

## 2022-01-11 NOTE — ASSESSMENT & PLAN NOTE
Bilateral minimal cerumen removed with suction #5  No procedure  Recommend use of Ofloxacin drops to left ear due to discomfort with use of otoscope    May use Ofloxacin drops due to cipro allergy (reaction to Cipro is GI distress)

## 2022-01-11 NOTE — PROGRESS NOTES
ENT HEARING EVALUATION    Name:  Ragini Palmer  :  1985  Age:  39 y o  Date of Evaluation: 22     History: ENT Audiogram  Reason for visit: Ragini Palmer is being seen today at the request of DIANNA Sanchez for an evaluation of hearing as part of their ENT visit  EVALUATION:    Otoscopic Evaluation:   Right Ear: Clear and healthy ear canal and tympanic membrane   Left Ear: Clear and healthy ear canal and tympanic membrane    Tympanometry:   Right: Type A - normal middle ear pressure and compliance   Left: Type A - normal middle ear pressure and compliance    Audiogram Results:  Pure tone testing revealed normal hearing bilaterally  SRT and PTA are in agreement indicating good test reliability  Word recognition scores were excellent bilaterally  *see attached audiogram      RECOMMENDATIONS:  Consult ENT   Audiologic re-evaluation as needed        Fabi Garduno, AuD , [address] [place] #[id number]

## 2022-01-11 NOTE — ASSESSMENT & PLAN NOTE
Discussed with patient there are multiple causes to left ear pressure with pulsing tinnitus including TMJ syndrome, ETD, musculoskeletal changes, otitis externa, cerumen impaction, hypertension, vs recurrent tonsilliths  After discussion agree to address concerns in multi faceted approach  Random blood pressure checks  Ear drops left ear for low grade otitis externa  Fluticasone (Flonase) one spray each nostril two times per day for ETD    TMJ syndrome - soft diet, jaw rest, NSAIDs, warm compresses, massage, oral appliance, or consultation with oral surgeon  Monitor if worsening ear pressure associated with when has tonsil stones on left

## 2022-01-11 NOTE — PROGRESS NOTES
Assessment/Plan:    Pulsatile tinnitus of left ear  Audiogram today negative for findings, normal bilateral hearing  Tymps type A bilaterally  Reviewed nature of pulsatile tinnitus  Discussed risk of intracranial HTN vs primary HTN due to pulsatile tinnitus  This may also be associated with musculoskeletal changes found with TMJ syndrome  Recommend and he agreed to monitor blood pressure  Random checks throughout the day  Start Flonase nasal spray to address ETD  If pulsing persists despite Flonase and other interventions, then consider CTA head and neck rule out intracranial HTN  Chronic reactive otitis externa of left ear  Bilateral minimal cerumen removed with suction #5  No procedure  Recommend use of Ofloxacin drops to left ear due to discomfort with use of otoscope  May use Ofloxacin drops due to cipro allergy (reaction to Cipro is GI distress)       Pressure sensation in left ear  Discussed with patient there are multiple causes to left ear pressure with pulsing tinnitus including TMJ syndrome, ETD, musculoskeletal changes, otitis externa, cerumen impaction, hypertension, vs recurrent tonsilliths  After discussion agree to address concerns in multi faceted approach  Random blood pressure checks  Ear drops left ear for low grade otitis externa  Fluticasone (Flonase) one spray each nostril two times per day for ETD    TMJ syndrome - soft diet, jaw rest, NSAIDs, warm compresses, massage, oral appliance, or consultation with oral surgeon  Monitor if worsening ear pressure associated with when has tonsil stones on left             Diagnoses and all orders for this visit:    ETD (Eustachian tube dysfunction), left  -     Ambulatory referral to Audiology    Chronic reactive otitis externa of left ear  -     Discontinue: ofloxacin (FLOXIN) 0 3 % otic solution; Administer 4 drops into the left ear 2 (two) times a day  -     ofloxacin (FLOXIN) 0 3 % otic solution; Administer 4 drops into the left ear 2 (two) times a day for 7 days    Sensation of fullness in left ear  -     Ambulatory Referral to Otolaryngology    Pulsatile tinnitus of left ear    Tonsillith    TMJ (temporomandibular joint syndrome)    Pressure sensation in left ear          Subjective:      Patient ID: Lennie Butler is a 39 y o  male  Presents today as a new patient due to ear concerns  Left ear discomfort  Began about year ago  When began would have popping in left ear  Since then feels pulse inside ear  With loud noises becomes more sensitive in left ear  No otalgia  Occasional headaches unsure if related  Warm sensation left ear at times  Ear pressure comes and goes  No otorrhea  No prior ear surgery  No current hearing aids  Additionally has recurrent tonsil stones on the left  Began about 2 years ago  The following portions of the patient's history were reviewed and updated as appropriate: allergies, current medications, past family history, past medical history, past social history, past surgical history and problem list     Review of Systems   Constitutional: Negative  HENT: Positive for ear pain and sore throat  Negative for congestion, ear discharge, hearing loss, nosebleeds, postnasal drip, rhinorrhea, sinus pressure, sinus pain, tinnitus and voice change  Eyes: Negative  Respiratory: Negative for chest tightness and shortness of breath  Cardiovascular: Negative  Gastrointestinal: Negative  Endocrine: Negative  Musculoskeletal: Negative  Skin: Negative for color change  Neurological: Negative for dizziness, numbness and headaches  Psychiatric/Behavioral: Negative  Objective:      Temp 98 4 °F (36 9 °C) (Temporal)   Ht 5' 10" (1 778 m)   Wt 70 3 kg (155 lb)   BMI 22 24 kg/m²          Physical Exam  Constitutional:       Appearance: He is well-developed  HENT:      Head: Normocephalic  Jaw: Malocclusion present        Right Ear: Hearing, tympanic membrane, ear canal and external ear normal  No decreased hearing noted  No drainage or tenderness  Tympanic membrane is not perforated or erythematous  Left Ear: Hearing, tympanic membrane, ear canal and external ear normal  No decreased hearing noted  No drainage or tenderness  Tympanic membrane is not perforated or erythematous  Nose: Nose normal  No nasal deformity or septal deviation  Mouth/Throat:      Mouth: Mucous membranes are not pale and not dry  No oral lesions  Dentition: Normal dentition  Pharynx: Uvula midline  No oropharyngeal exudate  Tonsils: 2+ on the right  2+ on the left  Comments: Tonsillith visible on left superior tonsil  Evidence of bruxism, dental wear, increased muscle tension bilateral TMJ  Neck:      Trachea: No tracheal deviation  Cardiovascular:      Rate and Rhythm: Normal rate  Pulmonary:      Effort: Pulmonary effort is normal  No accessory muscle usage or respiratory distress  Musculoskeletal:      Right shoulder: Normal range of motion  Cervical back: Full passive range of motion without pain, normal range of motion and neck supple  Lymphadenopathy:      Cervical: No cervical adenopathy  Skin:     General: Skin is warm and dry  Neurological:      Mental Status: He is alert and oriented to person, place, and time  Cranial Nerves: No cranial nerve deficit  Sensory: No sensory deficit  Psychiatric:         Behavior: Behavior is cooperative

## 2022-01-11 NOTE — PATIENT INSTRUCTIONS
Random blood pressure checks  Ear drops left ear  Fluticasone (Flonase) one spray each nostril two times per day  TMJ syndrome - soft diet, jaw rest, NSAIDs, warm compresses, massage, oral appliance, or consultation with oral surgeon  Monitor if worsening ear pressure associated with when has tonsil stones on left

## 2022-02-14 ENCOUNTER — OFFICE VISIT (OUTPATIENT)
Dept: OTOLARYNGOLOGY | Facility: CLINIC | Age: 37
End: 2022-02-14
Payer: COMMERCIAL

## 2022-02-14 VITALS — BODY MASS INDEX: 22.19 KG/M2 | WEIGHT: 155 LBS | HEIGHT: 70 IN | TEMPERATURE: 97.6 F

## 2022-02-14 DIAGNOSIS — H93.232 HYPERACUSIA, LEFT: ICD-10-CM

## 2022-02-14 DIAGNOSIS — H93.A2 PULSATILE TINNITUS OF LEFT EAR: Primary | ICD-10-CM

## 2022-02-14 DIAGNOSIS — H93.12 TINNITUS AURIUM, LEFT: ICD-10-CM

## 2022-02-14 PROCEDURE — 99213 OFFICE O/P EST LOW 20 MIN: CPT | Performed by: STUDENT IN AN ORGANIZED HEALTH CARE EDUCATION/TRAINING PROGRAM

## 2022-02-14 NOTE — PROGRESS NOTES
Otolaryngology-- Head and Neck Surgery Follow up visit      Follow up:  I hear my sound very loud on my left side  Certain freq bothers me alot    Tinnitus  Onset of tinnitus: 18 months  Described the sound as sometimes can her pulse  Site : left   Severity:  Not preventing from getting to sleep at night   No affecting work or home life  No affecting social activities  Intermittent   Noise exposure, ear trauma and ear surgery history  No history of exposure to loud noise like explosion   history of long term exposure to loud noise   No history of head trauma or concussion   No history of ear trauma or surgeries  Relevant medications history  No history of ototoxic medications intake like chemotherapy, aspirin, diuretics and anti depressant   Not associated with a new medication   Neck and TMJ arthritis history  No history of neck problem   history of jaw or TMJ problem  Relevant general medical history   No history of high blood pressure  No history of CVS disease  No history of DM  No history of anemia  No history of thyroid problem  No history of autoimmune disease   Ears history   No history of hearing loss  No history of ear pain  No history of ear drainage   No history of vertigo  URTI  No history of recent URTI  Relevant social history  Non smoker  Non alcoholic consumer   Not a heavy caffeine consumer   Tinnitus therapy  Never tried hearing aid  Never tried masker   Never tried white noise  Never tried any kind of therapy  Hearing test  Hearing tests performed last visit and showed:  Tympanogram bilateral type A  Audiogram normal    Review of any relevant imaging:  Hearing test reviewed (look above)  Interval Review of systems: Pertinent review of systems documented in the HPI      Interval Social History:  Social History     Socioeconomic History    Marital status: /Civil Union     Spouse name: Not on file    Number of children: Not on file    Years of education: Not on file    Highest education level: Not on file   Occupational History    Not on file   Tobacco Use    Smoking status: Never Smoker    Smokeless tobacco: Never Used   Vaping Use    Vaping Use: Never used   Substance and Sexual Activity    Alcohol use: Yes     Comment: socially    Drug use: Never    Sexual activity: Not on file   Other Topics Concern    Not on file   Social History Narrative    Not on file     Social Determinants of Health     Financial Resource Strain: Not on file   Food Insecurity: Not on file   Transportation Needs: Not on file   Physical Activity: Not on file   Stress: Not on file   Social Connections: Not on file   Intimate Partner Violence: Not on file   Housing Stability: Not on file       Interval Physical Examination:  Temp 97 6 °F (36 4 °C) (Temporal)   Ht 5' 10" (1 778 m)   Wt 70 3 kg (155 lb)   BMI 22 24 kg/m²   Head: Atraumatic, no visible scalp lesions, parotid and submandibular salivary glands non-tender to palpation and without masses bilaterally  Neck:  No visible or palpable cervical lesions or lymphadenopathy, thyroid gland is normal in size and symmetry and without masses, normal laryngeal elevation with swallowing  Ears:    Right ear :  Auricles normal in appearance, mastoid prominence non-tender, external auditory canal clear  Tympanic membrane intact  Left ear :  Auricles normal in appearance, mastoid prominence non-tender, external auditory canal clear  Tympanic membrane intact  Nose/Sinuses:  External appearance unremarkable, no maxillary or frontal sinus tenderness to palpation bilaterally  Anterior rhinoscopy reveals:  Oral Cavity:  Moist mucus membranes, gums and dentition unremarkable, no oral mucosal masses or lesions, floor of mouth soft, tongue mobile without masses or lesions  Oropharynx:  Base of tongue soft and without masses, tonsils bilaterally unremarkable, soft palate mucosa unremarkable         Abdomen: Soft and lax  Extremities: No bruises   Eyes: Extra-ocular movements intact, pupils equally round and reactive to light and accommodation, the lids and conjunctivae are normal in appearance  Constitutional:  Well developed, well nourished and groomed, in no acute distress  Cardiovascular:  Normal rate and rhythm, no palpable thrills, no jugulovenous distension observed  Respiratory:  Normal respiratory effort without evidence of retractions or use of accessory muscles  Neurologic:  Cranial nerves II-XII intact bilaterally  Psychiatric:  Alert and oriented to time, place and person  Procedure:  none    Assessment:  1  Pulsatile tinnitus of left ear     2  Tinnitus aurium, left     3  Hyperacusia, left         Plan:    Hyperacusia and tinnitus  Tinnitus  Decrease caffeine intake  Avoid tobacco, high doses of aspirin or ibuprofen; these make tinnitus wors  Avoid loud sounds  These can make tinnitus worse  Use a radio set at night between stations at night  Use a sound generator at night (which you can buy on [address] [address]) or tinnitus phone applications  Settings that can be very good for tinnitus include shower sounds, rainfall, and ocean sounds, although you should try the other settings to see what works best for you  You can also download sound files from the internet and play them on your computer or smartphone  Effective sounds include white noise, pink noise, and grey noise  You can listen to these on youtube prior to purchasing them for your phone or computer  Use a fan in the bedroom at night (during summer)  Tinnitus specific therapies include cognitive behavior therapy, tinnitus retraining therapy and neuromonics device  They are not covered by insurance  They help you ignore the sounds of the tinnitus, which then decreases the tinnitus sound further  They require many months of attendance (12 to 18 months) to be effective    Cognitive behavior therapy Maribel Sharif [phone]) or Jeff Tatum ([phone])  Trial of transcendental meditation, acupuncture or yoga  [facility], [address] [facility] [phone]  [address] [place] [address] Dr Astrid Staton  Karma Torres, [address]  Medications for treatment of anxiety and depression can significantly improve tinnitus for people with anxiety or depression  If you have these conditions and are under the care of a psychiatrist, you should discuss improved management of them with your psychiatrist    Tinnitus worsens when you think about it, so try not to focus your thoughts on it or arrange your life around this sound  If you have hearing loss, use of hearing aids can significantly improve your tinnitus as well as improve your hearing  Acupuncture can be helpful in the management of your tinnitus  Transcendental Meditation can be helpful in the management of your tinnitus  Therapeutic massage can be helpful in the management of your tinnitus

## 2022-09-08 ENCOUNTER — OFFICE VISIT (OUTPATIENT)
Dept: FAMILY MEDICINE CLINIC | Facility: CLINIC | Age: 37
End: 2022-09-08
Payer: COMMERCIAL

## 2022-09-08 VITALS
RESPIRATION RATE: 18 BRPM | SYSTOLIC BLOOD PRESSURE: 134 MMHG | HEART RATE: 97 BPM | WEIGHT: 158.2 LBS | DIASTOLIC BLOOD PRESSURE: 78 MMHG | HEIGHT: 70 IN | OXYGEN SATURATION: 99 % | TEMPERATURE: 96.5 F | BODY MASS INDEX: 22.65 KG/M2

## 2022-09-08 DIAGNOSIS — F41.1 GAD (GENERALIZED ANXIETY DISORDER): ICD-10-CM

## 2022-09-08 DIAGNOSIS — Z76.89 ENCOUNTER TO ESTABLISH CARE: Primary | ICD-10-CM

## 2022-09-08 PROCEDURE — 3725F SCREEN DEPRESSION PERFORMED: CPT | Performed by: NURSE PRACTITIONER

## 2022-09-08 PROCEDURE — 99214 OFFICE O/P EST MOD 30 MIN: CPT | Performed by: NURSE PRACTITIONER

## 2022-09-08 RX ORDER — ESCITALOPRAM OXALATE 10 MG/1
10 TABLET ORAL DAILY
Qty: 30 TABLET | Refills: 1 | Status: SHIPPED | OUTPATIENT
Start: 2022-09-08

## 2022-09-08 RX ORDER — HYDROXYZINE PAMOATE 25 MG/1
25 CAPSULE ORAL
Qty: 30 CAPSULE | Refills: 0 | Status: SHIPPED | OUTPATIENT
Start: 2022-09-08

## 2022-09-08 NOTE — PROGRESS NOTES
Assessment/Plan:    1  Encounter to establish care    2  KAILYN (generalized anxiety disorder)  -     escitalopram (Lexapro) 10 mg tablet; Take 1 tablet (10 mg total) by mouth daily  -     hydrOXYzine pamoate (VISTARIL) 25 mg capsule; Take 1 capsule (25 mg total) by mouth daily at bedtime as needed for itching or anxiety          Patient Instructions:  Supportive care discussed and advised  Advised to RTO for any worsening and no improvement  Follow up for no improvement and worsening of conditions  Patient advised and educated when to see immediate medical care  Return in about 6 weeks (around 10/20/2022), or if symptoms worsen or fail to improve  Future Appointments   Date Time Provider Patrick Fried   10/20/2022  9:15 AM DIANNA Campbell [facility]           Subjective:      Patient ID: Ayla Rosas is a 40 y o  male  Chief Complaint   Patient presents with    Establish Care     Nm lpn    Anxiety     Started a week ago    Headache     Started a week ago         Vitals:  /78   Pulse 97   Temp (!) 96 5 °F (35 8 °C)   Resp 18   Ht 5' 10" (1 778 m)   Wt 71 8 kg (158 lb 3 2 oz)   SpO2 99%   BMI 22 70 kg/m²     HPI  New to practice  Personal and family medical history reviewed  Patient stated that wanted to discuss his anxiety issue  Stated that had panic attack about 2 weeks ago at time of dropping her daughter at day care and since then having anxiety on and off with headaches and affecting his quality of life and his work  Stated that had panic attack about 8  Years ago and at that time thought was may be due to lyme disease but not sure and took clonazepam for short time as needed but does not want to take that  PHQ-2/9 Depression Screening    Little interest or pleasure in doing things: 0 - not at all  Feeling down, depressed, or hopeless: 0 - not at all  PHQ-2 Score: 0  PHQ-2 Interpretation: Negative depression screen         ?         The following portions of the patient's history were reviewed and updated as appropriate: allergies, current medications, past family history, past medical history, past social history, past surgical history and problem list       Review of Systems   HENT: Negative  Respiratory: Negative  Cardiovascular: Negative  Neurological: Positive for headaches  Psychiatric/Behavioral: The patient is nervous/anxious  Objective:    Social History     Tobacco Use   Smoking Status Never Smoker   Smokeless Tobacco Never Used       Allergies: Allergies   Allergen Reactions    Ciprofloxacin GI Intolerance     severe nausea - no vomiting         Current Outpatient Medications   Medication Sig Dispense Refill    cycloSPORINE (RESTASIS) 0 05 % ophthalmic emulsion 1 drop 2 (two) times a day      escitalopram (Lexapro) 10 mg tablet Take 1 tablet (10 mg total) by mouth daily 30 tablet 1    hydrocortisone 1 % cream Apply topically in the morning        hydrOXYzine pamoate (VISTARIL) 25 mg capsule Take 1 capsule (25 mg total) by mouth daily at bedtime as needed for itching or anxiety 30 capsule 0    Multiple Vitamin (ONE-A-DAY MENS PO) Take by mouth in the morning       No current facility-administered medications for this visit  Physical Exam  Constitutional:       Appearance: Normal appearance  HENT:      Head: Normocephalic  Nose: Nose normal    Eyes:      Conjunctiva/sclera: Conjunctivae normal    Cardiovascular:      Rate and Rhythm: Normal rate and regular rhythm  Heart sounds: Normal heart sounds  Pulmonary:      Effort: Pulmonary effort is normal       Breath sounds: Normal breath sounds  Musculoskeletal:         General: No swelling or tenderness  Normal range of motion  Skin:     General: Skin is warm and dry  Findings: No rash  Neurological:      Mental Status: He is alert and oriented to person, place, and time  Psychiatric:         Mood and Affect: Mood is anxious  Behavior: Behavior normal          Thought Content:  Thought content normal          Judgment: Judgment normal                      DIANNA Harris

## 2022-09-08 NOTE — PATIENT INSTRUCTIONS
Hydroxyzine (By mouth)   Hydroxyzine Pamoate (jzq-TOBF-t-zeen EDLANEY-oh-ate)  Treats anxiety, nausea, vomiting, allergies, skin rash, hives, and itching  May also be used with anesthesia for medical procedures  Brand Name(s): Vistaril   There may be other brand names for this medicine  When This Medicine Should Not Be Used: This medicine is not right for everyone  Do not use it if you had an allergic reaction to hydroxyzine, cetirizine, or levocetirizine  Do not use it during the early part of pregnancy or if you have prolonged QT interval   How to Use This Medicine:   Capsule, Liquid, Tablet  Your doctor will tell you how much medicine to use  Do not use more than directed  Oral liquid: Measure the oral liquid medicine with a marked measuring spoon, oral syringe, or medicine cup  Shake well just before use  Tablet: Swallow whole  Do not break, crush, or chew it  Missed dose: Take a dose as soon as you remember  If it is almost time for your next dose, wait until then and take a regular dose  Do not take extra medicine to make up for a missed dose  Store the medicine in a closed container at room temperature, away from heat, moisture, and direct light  Drugs and Foods to Avoid:   Ask your doctor or pharmacist before using any other medicine, including over-the-counter medicines, vitamins, and herbal products  Some medicines can affect how hydroxyzine works  Tell your doctor if you are using any of the following:  Droperidol, methadone, ondansetron, pentamidine  Antibiotic (including azithromycin, clarithromycin, erythromycin, gatifloxacin, moxifloxacin)  Medicine to treat depression (including citalopram, fluoxetine)  Medicine to treat heart rhythm problems (including amiodarone, procainamide, quinidine, sotalol)  Medicine to treat mental health problems (including chlorpromazine, clozapine, iloperidone, quetiapine, ziprasidone)  Tell your doctor if you use anything else that makes you sleepy   Some examples are allergy medicine, narcotic pain medicine, and alcohol  Warnings While Using This Medicine:   Tell your doctor if you are pregnant or breastfeeding, or if you have heart disease, heart failure, a slow heartbeat, skin problems, or had a recent heart attack  This medicine may cause the following problems:  Changes in your heart rhythm  Serious skin reaction called acute generalized exanthematous pustulosis (AGEP)  This medicine may make you drowsy  Do not drive or do anything that could be dangerous until you know how this medicine affects you  Call your doctor if your symptoms do not improve or if they get worse  Keep all medicine out of the reach of children  Never share your medicine with anyone  Possible Side Effects While Using This Medicine:   Call your doctor right away if you notice any of these side effects: Allergic reaction: Itching or hives, swelling in your face or hands, swelling or tingling in your mouth or throat, chest tightness, trouble breathing  Fainting, dizziness, lightheadedness  Fast, pounding, or uneven heartbeat  Fever, skin rash  Severe tiredness  If you notice these less serious side effects, talk with your doctor:   Drowsiness, sleepiness  Dry mouth  If you notice other side effects that you think are caused by this medicine, tell your doctor  Call your doctor for medical advice about side effects  You may report side effects to FDA at 5-157-FDA-5155    © Copyright [facility] 2022 Information is for End User's use only and may not be sold, redistributed or otherwise used for commercial purposes  The above information is an  only  It is not intended as medical advice for individual conditions or treatments  Talk to your doctor, nurse or pharmacist before following any medical regimen to see if it is safe and effective for you  Escitalopram (By mouth)   Escitalopram (kw-ifj-RPG-oh-pram)  Treats depression and generalized anxiety disorder (KAILYN)     Brand Name(s): Lexapro   There may be other brand names for this medicine  When This Medicine Should Not Be Used: This medicine is not right for everyone  Do not use it if you had an allergic reaction to escitalopram or citalopram   How to Use This Medicine:   Liquid, Tablet  Take this medicine as directed  You may need to take it for a month or more before you feel better  Your dose may need to be changed to find out what works best for you  Measure the oral liquid medicine with a marked measuring spoon, oral syringe, or medicine cup  This medicine should come with a Medication Guide  Ask your pharmacist for a copy if you do not have one  Missed dose: Take a dose as soon as you remember  If it is almost time for your next dose, wait until then and take a regular dose  Do not take extra medicine to make up for a missed dose  Store the medicine in a closed container at room temperature, away from heat, moisture, and direct light  Drugs and Foods to Avoid:   Ask your doctor or pharmacist before using any other medicine, including over-the-counter medicines, vitamins, and herbal products  Do not use this medicine together with pimozide  Do not use this medicine and an MAO inhibitor (MAOI) within 14 days of each other  Some medicines can affect how escitalopram works  Tell your doctor if you are using the following:   Buspirone, carbamazepine, cimetidine, desipramine, fentanyl, lithium, Vanessa's wort, tramadol  Amphetamines  Blood thinner (including warfarin)  Diuretic (water pill)  NSAID pain or arthritis medicine (including aspirin, ibuprofen, naproxen)  Triptan medicine to treat migraine headaches (including sumatriptan)  Tryptophan supplements  Tell your doctor if you use anything else that makes you sleepy  Some examples are allergy medicine, narcotic pain medicine, and alcohol  Do not drink alcohol while you are using this medicine    Warnings While Using This Medicine:   Tell your doctor if you are pregnant or breastfeeding, or if you have kidney disease, liver disease, bleeding problems, glaucoma, heart disease, or a seizure disorder  For some children, teenagers, and young adults, this medicine may increase mental or emotional problems  This may lead to thoughts of suicide and violence  Talk with your doctor right away if you have any thoughts or behavior changes that concern you  Tell your doctor if you or anyone in your family has a history of bipolar disorder or suicide attempts  This medicine may cause the following problems:   Serotonin syndrome (may be life-threatening when used with certain other medicines)  Increased risk of bleeding problems  Sexual problems  This medicine may make you dizzy or drowsy  Do not drive or do anything that could be dangerous until you know how this medicine affects you  Your doctor may want to monitor your child's weight and height, because this medicine may cause decreased appetite and weight loss in children  Do not stop using this medicine suddenly  Your doctor will need to slowly decrease your dose before you stop it completely  Your doctor will do lab tests at regular visits to check on the effects of this medicine  Keep all appointments  Keep all medicine out of the reach of children  Never share your medicine with anyone  Possible Side Effects While Using This Medicine:   Call your doctor right away if you notice any of these side effects:   Allergic reaction: Itching or hives, swelling in your face or hands, swelling or tingling in your mouth or throat, chest tightness, trouble breathing  Anxiety, restlessness, fever, sweating, muscle spasms, nausea, vomiting, diarrhea, seeing or hearing things that are not there  Confusion, weakness, and muscle twitching  Eye pain, vision changes, seeing halos around lights  Fast, pounding, or uneven heartbeat  Feeling more excited or energetic than usual, racing thoughts, trouble sleeping  Loss in sexual ability, desire, drive, or performance, delayed or inability to have an orgasm, inability to have or keep an erection  Painful, prolonged erection of your penis  Seizures  Thoughts of hurting yourself or others, unusual behavior  Unusual bleeding or bruising  If you notice these less serious side effects, talk with your doctor:   Dizziness, drowsiness, or sleepiness  Dry mouth  Headache  Nausea, constipation, diarrhea  If you notice other side effects that you think are caused by this medicine, tell your doctor  Call your doctor for medical advice about side effects  You may report side effects to FDA at 4-640-FDA-5133    © Copyright [employer] 2022 Information is for End User's use only and may not be sold, redistributed or otherwise used for commercial purposes  The above information is an  only  It is not intended as medical advice for individual conditions or treatments  Talk to your doctor, nurse or pharmacist before following any medical regimen to see if it is safe and effective for you

## 2022-09-10 ENCOUNTER — APPOINTMENT (OUTPATIENT)
Dept: RADIOLOGY | Facility: HOSPITAL | Age: 37
End: 2022-09-10
Payer: COMMERCIAL

## 2022-09-10 ENCOUNTER — HOSPITAL ENCOUNTER (EMERGENCY)
Facility: HOSPITAL | Age: 37
Discharge: HOME | End: 2022-09-10
Attending: EMERGENCY MEDICINE
Payer: COMMERCIAL

## 2022-09-10 VITALS
WEIGHT: 158 LBS | DIASTOLIC BLOOD PRESSURE: 70 MMHG | TEMPERATURE: 98.8 F | SYSTOLIC BLOOD PRESSURE: 131 MMHG | BODY MASS INDEX: 22.67 KG/M2 | HEART RATE: 87 BPM | RESPIRATION RATE: 14 BRPM | OXYGEN SATURATION: 100 %

## 2022-09-10 DIAGNOSIS — R00.2 PALPITATIONS: Primary | ICD-10-CM

## 2022-09-10 LAB
ANION GAP SERPL CALCULATED.3IONS-SCNC: 10 MMOL/L (ref 4–13)
BASOPHILS # BLD AUTO: 0.03 THOUSANDS/ΜL (ref 0–0.1)
BASOPHILS NFR BLD AUTO: 0 % (ref 0–1)
BUN SERPL-MCNC: 12 MG/DL (ref 5–25)
CALCIUM SERPL-MCNC: 8.7 MG/DL (ref 8.3–10.1)
CARDIAC TROPONIN I PNL SERPL HS: 2 NG/L
CHLORIDE SERPL-SCNC: 103 MMOL/L (ref 96–108)
CO2 SERPL-SCNC: 28 MMOL/L (ref 21–32)
CREAT SERPL-MCNC: 0.95 MG/DL (ref 0.6–1.3)
D DIMER PPP FEU-MCNC: <0.27 UG/ML FEU
EOSINOPHIL # BLD AUTO: 0.18 THOUSAND/ΜL (ref 0–0.61)
EOSINOPHIL NFR BLD AUTO: 3 % (ref 0–6)
ERYTHROCYTE [DISTWIDTH] IN BLOOD BY AUTOMATED COUNT: 12.3 % (ref 11.6–15.1)
GFR SERPL CREATININE-BSD FRML MDRD: 101 ML/MIN/1.73SQ M
GLUCOSE SERPL-MCNC: 102 MG/DL (ref 65–140)
HCT VFR BLD AUTO: 44.7 % (ref 36.5–49.3)
HGB BLD-MCNC: 15 G/DL (ref 12–17)
IMM GRANULOCYTES # BLD AUTO: 0.02 THOUSAND/UL (ref 0–0.2)
IMM GRANULOCYTES NFR BLD AUTO: 0 % (ref 0–2)
LYMPHOCYTES # BLD AUTO: 2.25 THOUSANDS/ΜL (ref 0.6–4.47)
LYMPHOCYTES NFR BLD AUTO: 31 % (ref 14–44)
MCH RBC QN AUTO: 29.1 PG (ref 26.8–34.3)
MCHC RBC AUTO-ENTMCNC: 33.6 G/DL (ref 31.4–37.4)
MCV RBC AUTO: 87 FL (ref 82–98)
MONOCYTES # BLD AUTO: 0.5 THOUSAND/ΜL (ref 0.17–1.22)
MONOCYTES NFR BLD AUTO: 7 % (ref 4–12)
NEUTROPHILS # BLD AUTO: 4.3 THOUSANDS/ΜL (ref 1.85–7.62)
NEUTS SEG NFR BLD AUTO: 59 % (ref 43–75)
NRBC BLD AUTO-RTO: 0 /100 WBCS
PLATELET # BLD AUTO: 206 THOUSANDS/UL (ref 149–390)
PMV BLD AUTO: 10.3 FL (ref 8.9–12.7)
POTASSIUM SERPL-SCNC: 3.5 MMOL/L (ref 3.5–5.3)
RBC # BLD AUTO: 5.16 MILLION/UL (ref 3.88–5.62)
SODIUM SERPL-SCNC: 141 MMOL/L (ref 135–147)
TSH SERPL DL<=0.05 MIU/L-ACNC: 2.17 UIU/ML (ref 0.45–4.5)
WBC # BLD AUTO: 7.28 THOUSAND/UL (ref 4.31–10.16)

## 2022-09-10 PROCEDURE — 93005 ELECTROCARDIOGRAM TRACING: CPT

## 2022-09-10 PROCEDURE — 85025 COMPLETE CBC W/AUTO DIFF WBC: CPT | Performed by: EMERGENCY MEDICINE

## 2022-09-10 PROCEDURE — 99284 EMERGENCY DEPT VISIT MOD MDM: CPT | Performed by: EMERGENCY MEDICINE

## 2022-09-10 PROCEDURE — 96374 THER/PROPH/DIAG INJ IV PUSH: CPT

## 2022-09-10 PROCEDURE — 71045 X-RAY EXAM CHEST 1 VIEW: CPT

## 2022-09-10 PROCEDURE — 99285 EMERGENCY DEPT VISIT HI MDM: CPT

## 2022-09-10 PROCEDURE — 80048 BASIC METABOLIC PNL TOTAL CA: CPT | Performed by: EMERGENCY MEDICINE

## 2022-09-10 PROCEDURE — 36415 COLL VENOUS BLD VENIPUNCTURE: CPT | Performed by: EMERGENCY MEDICINE

## 2022-09-10 PROCEDURE — 84484 ASSAY OF TROPONIN QUANT: CPT | Performed by: EMERGENCY MEDICINE

## 2022-09-10 PROCEDURE — 85379 FIBRIN DEGRADATION QUANT: CPT | Performed by: EMERGENCY MEDICINE

## 2022-09-10 PROCEDURE — 84443 ASSAY THYROID STIM HORMONE: CPT | Performed by: EMERGENCY MEDICINE

## 2022-09-10 RX ORDER — DIAZEPAM 5 MG/ML
2.5 INJECTION, SOLUTION INTRAMUSCULAR; INTRAVENOUS ONCE
Status: COMPLETED | OUTPATIENT
Start: 2022-09-10 | End: 2022-09-10

## 2022-09-10 RX ADMIN — DIAZEPAM 2.5 MG: 5 INJECTION, SOLUTION INTRAMUSCULAR; INTRAVENOUS at 02:44

## 2022-09-10 NOTE — ED PROVIDER NOTES
Final Diagnosis:  1  Palpitations        Chief Complaint   Patient presents with    Rapid Heart Rate     Patient c/o rapid heart rate since 01:30, did not take HR, did feel like he could not sit down and rest yesterday "felt like he was on speed", did start lexapro 10mg yesterday and took a klonopin from 8 years ago this am, no chest pain, reports some sob, unsure if from, anxiety     HPI  Patient presents feeling like he was "on speed" like the movie requiem for a dream where the mom is cleaning her house  This has included anxiety, palpitations, restlessnses  Started lexapro yesterday for anxiety  First dose  Also tried 1/4 of an  klonopin without relief  He says 1/4 pill used to be sufficient for him but he's worried about interaction between these medications  No n/v  Nonexertional  No diaphoresis  Mild sob though speaking full sentences without increased WOB satting RA  Patient examines anxious and a little tremulous  Denies other intoxicants  - No language barrier    - History obtained from patient  - There are no limitations to the history obtained  - Previous charting underwent limited review with attention to last ED visits, labs, ekgs, and prior imaging  PMH:   has a past medical history of Anxiety, Dry eye syndrome of both eyes, and Eczema  PSH:   has a past surgical history that includes Wrist surgery (Left)  Social History:    Tobacco Use: Low Risk     Smoking Tobacco Use: Never Smoker    Smokeless Tobacco Use: Never Used     Alcohol Use: Not on file     Patient with no illicit use    ROS:    Pertinent positives/negatives:   Review of Systems   Respiratory: Positive for shortness of breath  Cardiovascular: Positive for palpitations  Neurological: Positive for tremors  Psychiatric/Behavioral: The patient is nervous/anxious  CONSTITUTIONAL:  No dizziness  No weakness  No unexpected weight loss  EYES:  No pain, erythema, or discharge  No loss of vision    ENT:  No tinnitus, decreased hearing  No epistaxis/purulent drainage  No voice change, airway closing, trismus  CARDIOVASCULAR:  No chest pain  No palpitations  No new lower extremity edema  RESPIRATORY:  No purulent cough  No hemoptysis  No dyspnea  No paroxysmal nocturnal dyspnea  No stridor, audible wheezing bedside  GASTROINTESTINAL:  Normal appetite  No vomiting, diarrhea  No pain  No bloating  No melena  GENITOURINARY:  No frequency, urgency, nocturia  No hematuria or dysuria  No discharge  No sores/adenopathy  MUSCULOSKELETAL:  No arthralgias or myalgias that are new  INTEGUMENTARY:  No swelling  No unexpected contusions  No abrasions  No lymphangitis  NEUROLOGIC:  No meningismus  No numbness of the extremities  No new focal weakness  No postural instability  PSYCHIATRIC:  No SI HI AVH  HEMATOLOGICAL:  No bleeding  No petechiae  No bruising  ALLERGIES:  No urticaria  No sudden abd cramping  No stridor  PE:     Physical exam highlights:   Physical Exam       Vitals:    09/10/22 0210 09/10/22 0300 09/10/22 0330 09/10/22 0400   BP: 141/87 121/66 116/67 131/70   BP Location: Right arm Right arm     Pulse: 102 80 76 87   Resp: 20 16 14 14   Temp: 98 8 °F (37 1 °C)      TempSrc: Oral      SpO2: 100% 99% 99% 100%   Weight: 71 7 kg (158 lb)        Vitals reviewed by me  Nursing note reviewed  Chaperone present for all sensitive exam   Const: No acute distress  Alert  Nontoxic  Not diaphoretic  HEENT: External ears normal  No protrusion drainage swelling  Nose normal  No drainage/traumatic deformity  MMM  Mouth with baseline/symmetric movement  No trismus  Eyes: No squinting  No icterus  Tracks through the room with normal EOM  No tearing/swelling/drainage  Neck: ROM normal  No rigidity  No meningismus  Cards: Rate as per vitals  Compared to monitor sinus unless documented above  Regular  Well perfused  Pulm: able to verbalize without additional effort  Effort and excursion normal  No disress   No audible wheezing/ stridor  Normal resp rate  Abd: No distension beyond baseline  No fluctuant wave  Patient without peritoneal pain with shifting/bumping the bed  MSK: ROM normal and baseline  No deformity  Skin: No new rashes visible  Well perfused  Neuro: Nonfocal  Baseline  CN grossly intact  Moving all four with coordination  Psych: anxious and tremulous        A:  - Nursing note reviewed  Ddx and MDM  Anxiety most likely  EKG r/o arrythmia  New RBBB, incomplete  No prior EKG - so will check labs  WNL  Trial benzo  Some but not complete improvement     HEART Risk Score    Flowsheet Row Most Recent Value   Heart Score Risk Calculator    History 0 Filed at: 09/10/2022 2110   ECG 1 Filed at: 09/10/2022 2110   Age 0 Filed at: 09/10/2022 2110   Risk Factors 1 Filed at: 09/10/2022 2110   Troponin 0 Filed at: 09/10/2022 2110   HEART Score 2 Filed at: 09/10/2022 2110         Althea Marker' Criteria for PE    Flowsheet Row Most Recent Value   Wells' Criteria for PE    Clinical signs and symptoms of DVT 0 Filed at: 09/10/2022 2110   PE is primary diagnosis or equally likely 0 Filed at: 09/10/2022 2110   HR >100 1 5 Filed at: 09/10/2022 2110   Immobilization at least 3 days or Surgery in the previous 4 weeks 0 Filed at: 09/10/2022 2110   Previous, objectively diagnosed PE or DVT 0 Filed at: 09/10/2022 2110   Hemoptysis 0 Filed at: 09/10/2022 2110   Malignancy with treatment within 6 months or palliative 0 Filed at: 09/10/2022 2110   Althea Marker' Criteria Total 1 5 Filed at: 09/10/2022 2110                       XR chest 1 view portable   Final Result      No acute cardiopulmonary disease                    Workstation performed: [id number]           Orders Placed This Encounter   Procedures    XR chest 1 view portable    CBC and differential    Basic metabolic panel    HS Troponin 0hr (reflex protocol)    TSH    D-dimer, quantitative     Labs Reviewed   HS TROPONIN I 0HR - Normal       Result Value Ref Range Status    hs TnI 0hr 2  "Refer to ACS Flowchart"- see link ng/L Final    Comment:                                              Initial (time 0) result  If >=50 ng/L, Myocardial injury suggested ;  Type of myocardial injury and treatment strategy  to be determined  If 5-49 ng/L, a delta result at 2 hours and or 4 hours will be needed to further evaluate  If <4 ng/L, and chest pain has been >3 hours since onset, patient may qualify for discharge based on the HEART score in the ED  If <5 ng/L and <3hours since onset of chest pain, a delta result at 2 hours will be needed to further evaluate  HS Troponin 99th Percentile URL of a Health Population=12 ng/L with a 95% Confidence Interval of 8-18 ng/L  Second Troponin (time 2 hours)  If calculated delta >= 20 ng/L,  Myocardial injury suggested ; Type of myocardial injury and treatment strategy to be determined  If 5-49 ng/L and the calculated delta is 5-19 ng/L, consult medical service for evaluation  Continue evaluation for ischemia on ecg and other possible etiology and repeat hs troponin at 4 hours  If delta is <5 ng/L at 2 hours, consider discharge based on risk stratification via the HEART score (if in ED), or OMAIRA risk score in IP/Observation  HS Troponin 99th Percentile URL of a Health Population=12 ng/L with a 95% Confidence Interval of 8-18 ng/L    TSH, 3RD GENERATION - Normal    TSH 3RD GENERATON 2 173  0 450 - 4 500 uIU/mL Final    Comment: Adult TSH (3rd generation) reference range follows the recommended guidelines of the American [employer] Association, January, 2020  Narrative:     Patients undergoing fluorescein dye angiography may retain small amounts of fluorescein in the body for 48-72 hours post procedure  Samples containing fluorescein can produce falsely depressed TSH values  If the patient had this procedure,a specimen should be resubmitted post fluorescein clearance       D-DIMER, QUANTITATIVE - Normal    D-Dimer, Quant <0 27  <0 50 ug/ml FEU Final Comment: Reference and upper limits to exclude DVT and PE are the same  Do not use to exclude if clinical symptoms are present  CBC AND DIFFERENTIAL    WBC 7 28  4 31 - 10 16 Thousand/uL Final    RBC 5 16  3 88 - 5 62 Million/uL Final    Hemoglobin 15 0  12 0 - 17 0 g/dL Final    Hematocrit 44 7  36 5 - 49 3 % Final    MCV 87  82 - 98 fL Final    MCH 29 1  26 8 - 34 3 pg Final    MCHC 33 6  31 4 - 37 4 g/dL Final    RDW 12 3  11 6 - 15 1 % Final    MPV 10 3  8 9 - 12 7 fL Final    Platelets 471  027 - 390 Thousands/uL Final    nRBC 0  /100 WBCs Final    Neutrophils Relative 59  43 - 75 % Final    Immat GRANS % 0  0 - 2 % Final    Lymphocytes Relative 31  14 - 44 % Final    Monocytes Relative 7  4 - 12 % Final    Eosinophils Relative 3  0 - 6 % Final    Basophils Relative 0  0 - 1 % Final    Neutrophils Absolute 4 30  1 85 - 7 62 Thousands/µL Final    Immature Grans Absolute 0 02  0 00 - 0 20 Thousand/uL Final    Lymphocytes Absolute 2 25  0 60 - 4 47 Thousands/µL Final    Monocytes Absolute 0 50  0 17 - 1 22 Thousand/µL Final    Eosinophils Absolute 0 18  0 00 - 0 61 Thousand/µL Final    Basophils Absolute 0 03  0 00 - 0 10 Thousands/µL Final   BASIC METABOLIC PANEL    Sodium 594  135 - 147 mmol/L Final    Potassium 3 5  3 5 - 5 3 mmol/L Final    Chloride 103  96 - 108 mmol/L Final    CO2 28  21 - 32 mmol/L Final    ANION GAP 10  4 - 13 mmol/L Final    BUN 12  5 - 25 mg/dL Final    Creatinine 0 95  0 60 - 1 30 mg/dL Final    Comment: Standardized to IDMS reference method    Glucose 102  65 - 140 mg/dL Final    Comment: If the patient is fasting, the ADA then defines impaired fasting glucose as > 100 mg/dL and diabetes as > or equal to 123 mg/dL  Specimen collection should occur prior to Sulfasalazine administration due to the potential for falsely depressed results  Specimen collection should occur prior to Sulfapyridine administration due to the potential for falsely elevated results      Calcium 8 7  8 3 - 10 1 mg/dL Final    eGFR 101  ml/min/1 73sq m Final    Narrative:     [place] guidelines for Chronic Kidney Disease (CKD):     Stage 1 with normal or high GFR (GFR > 90 mL/min/1 73 square meters)    Stage 2 Mild CKD (GFR = 60-89 mL/min/1 73 square meters)    Stage 3A Moderate CKD (GFR = 45-59 mL/min/1 73 square meters)    Stage 3B Moderate CKD (GFR = 30-44 mL/min/1 73 square meters)    Stage 4 Severe CKD (GFR = 15-29 mL/min/1 73 square meters)    Stage 5 End Stage CKD (GFR <15 mL/min/1 73 square meters)  Note: GFR calculation is accurate only with a steady state creatinine       Final Diagnosis:  1  Palpitations        P:  - hospital tx includes   Medications   diazepam (VALIUM) injection 2 5 mg (2 5 mg Intravenous Given 9/10/22 0244)         - disposition  Time reflects when diagnosis was documented in both MDM as applicable and the Disposition within this note     Time User Action Codes Description Comment    9/10/2022  4:07 AM Mindi Lofton Add [R00 2] Palpitations       ED Disposition     ED Disposition   Discharge    Condition   Stable    Date/Time   Sat Sep 10, 2022  4:07 AM    Comment   Ally Merida discharge to home/self care  Follow-up Information     Follow up With Specialties Details Why Contact Info Additional [address] [id number]  [address] [place], [place] [address], [place], [facility]          - patient will call their PCP to let them know they were in the emergency department   We discuss return precautions       - additional tx intended, if consistent with primary provider:  - patient to follow with :      Discharge Medication List as of 9/10/2022  4:07 AM      CONTINUE these medications which have NOT CHANGED    Details   cycloSPORINE (RESTASIS) 0 05 % ophthalmic emulsion 1 drop 2 (two) times a day, Historical Med      escitalopram (Lexapro) 10 mg tablet Take 1 tablet (10 mg total) by mouth daily, Starting Thu 2022, Normal      hydrocortisone 1 % cream Apply topically in the morning  , Historical Med      Multiple Vitamin (ONE-A-DAY MENS PO) Take by mouth in the morning, Historical Med      hydrOXYzine pamoate (VISTARIL) 25 mg capsule Take 1 capsule (25 mg total) by mouth daily at bedtime as needed for itching or anxiety, Starting Thu 2022, Normal           No discharge procedures on file  Prior to Admission Medications   Prescriptions Last Dose Informant Patient Reported? Taking? Multiple Vitamin (ONE-A-DAY MENS PO) 2022 at Unknown time  Yes Yes   Sig: Take by mouth in the morning   cycloSPORINE (RESTASIS) 0 05 % ophthalmic emulsion 2022 at Unknown time Self Yes Yes   Si drop 2 (two) times a day   escitalopram (Lexapro) 10 mg tablet 2022 at Unknown time  No Yes   Sig: Take 1 tablet (10 mg total) by mouth daily   hydrOXYzine pamoate (VISTARIL) 25 mg capsule Unknown at Unknown time  No No   Sig: Take 1 capsule (25 mg total) by mouth daily at bedtime as needed for itching or anxiety   hydrocortisone 1 % cream 2022 at Unknown time  Yes Yes   Sig: Apply topically in the morning        Facility-Administered Medications: None       Portions of the record may have been created with voice recognition software  Occasional wrong word or "sound a like" substitutions may have occurred due to the inherent limitations of voice recognition software  Read the chart carefully and recognize, using context, where substitutions have occurred      Electronically signed by:  MD Becki Medrano MD  09/10/22 [id number]

## 2022-09-12 LAB
ATRIAL RATE: 92 BPM
P AXIS: 63 DEGREES
PR INTERVAL: 132 MS
QRS AXIS: 32 DEGREES
QRSD INTERVAL: 102 MS
QT INTERVAL: 370 MS
QTC INTERVAL: 457 MS
T WAVE AXIS: 62 DEGREES
VENTRICULAR RATE: 92 BPM

## 2022-09-12 PROCEDURE — 93010 ELECTROCARDIOGRAM REPORT: CPT | Performed by: INTERNAL MEDICINE

## 2023-03-03 ENCOUNTER — OFFICE VISIT (OUTPATIENT)
Dept: URGENT CARE | Facility: MEDICAL CENTER | Age: 38
End: 2023-03-03

## 2023-03-03 VITALS
WEIGHT: 161 LBS | DIASTOLIC BLOOD PRESSURE: 92 MMHG | HEIGHT: 70 IN | RESPIRATION RATE: 18 BRPM | BODY MASS INDEX: 23.05 KG/M2 | SYSTOLIC BLOOD PRESSURE: 143 MMHG | OXYGEN SATURATION: 96 % | HEART RATE: 96 BPM | TEMPERATURE: 98.6 F

## 2023-03-03 DIAGNOSIS — J02.9 SORE THROAT: Primary | ICD-10-CM

## 2023-03-03 DIAGNOSIS — H10.9 BACTERIAL CONJUNCTIVITIS OF RIGHT EYE: ICD-10-CM

## 2023-03-03 DIAGNOSIS — J06.9 VIRAL UPPER RESPIRATORY ILLNESS: ICD-10-CM

## 2023-03-03 LAB — S PYO AG THROAT QL: NEGATIVE

## 2023-03-03 RX ORDER — POLYMYXIN B SULFATE AND TRIMETHOPRIM 1; 10000 MG/ML; [USP'U]/ML
1 SOLUTION OPHTHALMIC EVERY 4 HOURS
Qty: 10 ML | Refills: 0 | Status: SHIPPED | OUTPATIENT
Start: 2023-03-03 | End: 2023-03-10

## 2023-03-03 RX ORDER — BENZONATATE 100 MG/1
100 CAPSULE ORAL 3 TIMES DAILY PRN
Qty: 20 CAPSULE | Refills: 0 | Status: SHIPPED | OUTPATIENT
Start: 2023-03-03

## 2023-03-03 RX ORDER — LIDOCAINE HYDROCHLORIDE 20 MG/ML
15 SOLUTION OROPHARYNGEAL 4 TIMES DAILY PRN
Qty: 100 ML | Refills: 0 | Status: SHIPPED | OUTPATIENT
Start: 2023-03-03

## 2023-03-03 RX ORDER — FLUTICASONE PROPIONATE 50 MCG
1 SPRAY, SUSPENSION (ML) NASAL 2 TIMES DAILY
Qty: 11.1 ML | Refills: 0 | Status: SHIPPED | OUTPATIENT
Start: 2023-03-03

## 2023-03-03 NOTE — PROGRESS NOTES
[facility]        NAME: Gini Ellison is a 40 y o  male  : 1985    MRN: [id number]  DATE: March 3, 2023  TIME: 9:10 AM    Assessment and Orders   Viral upper respiratory illness [J06 9]  1  Viral upper respiratory illness  benzonatate (TESSALON PERLES) 100 mg capsule    fluticasone (FLONASE) 50 mcg/act nasal spray    Lidocaine Viscous HCl (XYLOCAINE) 2 % mucosal solution      2  Sore throat  POCT rapid strepA    Lidocaine Viscous HCl (XYLOCAINE) 2 % mucosal solution      3  Bacterial conjunctivitis of right eye  polymyxin b-trimethoprim (POLYTRIM) ophthalmic solution            Plan and Discussion      Symptoms and exam consistent with viral URI  Will treat with tessalon Perles, viscous lidocaine and Flonase  Eye redness and drainage likely viral in etiology, however will use antibiotic drops to cover for bacterial infection given the thick eye drainage  Discussed ED precautions including (but not limited to)  • Difficultly breathing or shortness of breath  • Chest pain  • Acutely worsening symptoms  Risks and benefits discussed  Patient understands and agrees with the plan  Follow up with PCP  Chief Complaint     Chief Complaint   Patient presents with   • Sore Throat     Pt  With sore throat and fever that began 10 days ago   • Eye Drainage     Pt  With redness and drainage from his right eye that began yesterday  History of Present Illness       Started on  with fever - home tested for covid x 2 - both negative  Using cough drops and NSAIDs  Eye reddness and drainage started this AM     Sore Throat   This is a new problem  The current episode started 1 to 4 weeks ago ()  The maximum temperature recorded prior to his arrival was 101 - 101 9 F (only on day 1)  Associated symptoms include congestion, coughing (morning and night), shortness of breath (coughing at night) and trouble swallowing (this is improving)  Pertinent negatives include no headaches  Review of Systems   Review of Systems   HENT: Positive for congestion, sore throat and trouble swallowing (this is improving)  Respiratory: Positive for cough (morning and night) and shortness of breath (coughing at night)  Neurological: Negative for headaches           Current Medications       Current Outpatient Medications:   •  benzonatate (TESSALON PERLES) 100 mg capsule, Take 1 capsule (100 mg total) by mouth 3 (three) times a day as needed for cough, Disp: 20 capsule, Rfl: 0  •  cycloSPORINE (RESTASIS) 0 05 % ophthalmic emulsion, 1 drop 2 (two) times a day, Disp: , Rfl:   •  fluticasone (FLONASE) 50 mcg/act nasal spray, 1 spray into each nostril 2 (two) times a day, Disp: 11 1 mL, Rfl: 0  •  hydrocortisone 1 % cream, Apply topically in the morning  , Disp: , Rfl:   •  Lidocaine Viscous HCl (XYLOCAINE) 2 % mucosal solution, Swish and spit 15 mL 4 (four) times a day as needed for mouth pain or discomfort Gargle and spit 15mL 4x a day as needed for sore throat, Disp: 100 mL, Rfl: 0  •  Multiple Vitamin (ONE-A-DAY MENS PO), Take by mouth in the morning, Disp: , Rfl:   •  polymyxin b-trimethoprim (POLYTRIM) ophthalmic solution, Administer 1 drop to the right eye every 4 (four) hours for 7 days, Disp: 10 mL, Rfl: 0  •  escitalopram (Lexapro) 10 mg tablet, Take 1 tablet (10 mg total) by mouth daily (Patient not taking: Reported on 3/3/2023), Disp: 30 tablet, Rfl: 1  •  hydrOXYzine pamoate (VISTARIL) 25 mg capsule, Take 1 capsule (25 mg total) by mouth daily at bedtime as needed for itching or anxiety (Patient not taking: Reported on 3/3/2023), Disp: 30 capsule, Rfl: 0    Current Allergies     Allergies as of 03/03/2023 - Reviewed 03/03/2023   Allergen Reaction Noted   • Ciprofloxacin GI Intolerance 09/18/2018            The following portions of the patient's history were reviewed and updated as appropriate: allergies, current medications, past family history, past medical history, past social history, past surgical history and problem list      Past Medical History:   Diagnosis Date   • Anxiety    • Dry eye syndrome of both eyes    • Eczema        Past Surgical History:   Procedure Laterality Date   • WRIST SURGERY Left     ganglion cyst       Family History   Problem Relation Age of Onset   • No Known Problems Mother    • No Known Problems Father    • No Known Problems Sister    • No Known Problems Brother    • Ovarian cancer Maternal Grandmother    • Brain cancer Maternal Grandfather    • No Known Problems Paternal Grandmother    • Bone cancer Paternal Grandfather    • Ovarian cancer Paternal Aunt          Medications have been verified  Objective   /92   Pulse 96   Temp 98 6 °F (37 °C)   Resp 18   Ht 5' 10" (1 778 m)   Wt 73 kg (161 lb)   SpO2 96%   BMI 23 10 kg/m²   No LMP for male patient  Physical Exam     Physical Exam  Constitutional:       General: He is not in acute distress  HENT:      Nose: Congestion present  Mouth/Throat:      Pharynx: Pharyngeal swelling and posterior oropharyngeal erythema present  No oropharyngeal exudate or uvula swelling  Tonsils: No tonsillar exudate  0 on the right  1+ on the left  Eyes:      General:         Right eye: Discharge present  Conjunctiva/sclera:      Right eye: Right conjunctiva is injected  Cardiovascular:      Rate and Rhythm: Normal rate  Pulmonary:      Effort: No respiratory distress  Breath sounds: No wheezing  Neurological:      General: No focal deficit present  Mental Status: He is alert and oriented to person, place, and time     Psychiatric:         Mood and Affect: Mood normal          Behavior: Behavior normal                Patrice John DO

## 2023-03-05 LAB — BACTERIA THROAT CULT: NORMAL

## 2023-07-18 ENCOUNTER — OFFICE VISIT (OUTPATIENT)
Dept: URGENT CARE | Facility: CLINIC | Age: 38
End: 2023-07-18
Payer: COMMERCIAL

## 2023-07-18 VITALS
HEART RATE: 84 BPM | DIASTOLIC BLOOD PRESSURE: 78 MMHG | SYSTOLIC BLOOD PRESSURE: 129 MMHG | TEMPERATURE: 96.3 F | RESPIRATION RATE: 18 BRPM | OXYGEN SATURATION: 100 %
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
## 2023-07-18 DIAGNOSIS — L08.9 INFECTED INSECT BITE OF LEFT LOWER EXTREMITY, INITIAL ENCOUNTER: Primary | ICD-10-CM

## 2023-07-18 DIAGNOSIS — S80.862A INFECTED INSECT BITE OF LEFT LOWER EXTREMITY, INITIAL ENCOUNTER: Primary | ICD-10-CM

## 2023-07-18 PROCEDURE — 99213 OFFICE O/P EST LOW 20 MIN: CPT | Performed by: PHYSICIAN ASSISTANT

## 2023-07-18 RX ORDER — CEPHALEXIN 500 MG/1
500 CAPSULE ORAL EVERY 6 HOURS SCHEDULED
Qty: 28 CAPSULE | Refills: 0 | Status: SHIPPED | OUTPATIENT
Start: 2023-07-18 | End: 2023-07-25

## 2023-07-18 NOTE — PROGRESS NOTES
[facility]        NAME: Bruno Lennon is a 40 y.o. male  : 1985    MRN: [id number]  DATE: 2023  TIME: 3:41 PM    Assessment and Plan   Infected insect bite of left lower extremity, initial encounter [U65.284M, L08.9, W57. XXXA]  1. Infected insect bite of left lower extremity, initial encounter  cephalexin (KEFLEX) 500 mg capsule            Patient Instructions       Follow up with PCP in 3-5 days. Proceed to  ER if symptoms worsen. Chief Complaint     Chief Complaint   Patient presents with   • Insect Bite     Patient has insect bite to left calf that he noticed yesterday. Irregular border of redness around the area. C/o pain and itchiness to area. Unknown what bit him. History of Present Illness       Jason Gleason is a 42-year-old male who presents to clinic complaining of left calf insect bite and possible infection x 2 days. He states he was outside over the weekend and does not recall anything biting him but noticed a dark lesion in the left calf 2 days ago and it is getting progressively more red as the days goes on. He also notes pain/itching. He denies any fever or chills. He denies any bleeding or discharge from it. He denies any joint pain or limitation of range of motion. Review of Systems   Review of Systems   Constitutional: Negative for chills and fever. Musculoskeletal: Negative for arthralgias and joint swelling. Skin: Positive for color change and wound.          Current Medications       Current Outpatient Medications:   •  cephalexin (KEFLEX) 500 mg capsule, Take 1 capsule (500 mg total) by mouth every 6 (six) hours for 7 days, Disp: 28 capsule, Rfl: 0  •  Multiple Vitamin (ONE-A-DAY MENS PO), Take by mouth in the morning, Disp: , Rfl:   •  benzonatate (TESSALON PERLES) 100 mg capsule, Take 1 capsule (100 mg total) by mouth 3 (three) times a day as needed for cough (Patient not taking: Reported on 2023), Disp: 20 capsule, Rfl: 0  •  cycloSPORINE (RESTASIS) 0.05 % ophthalmic emulsion, 1 drop 2 (two) times a day (Patient not taking: Reported on 7/18/2023), Disp: , Rfl:   •  escitalopram (Lexapro) 10 mg tablet, Take 1 tablet (10 mg total) by mouth daily (Patient not taking: Reported on 3/3/2023), Disp: 30 tablet, Rfl: 1  •  fluticasone (FLONASE) 50 mcg/act nasal spray, 1 spray into each nostril 2 (two) times a day (Patient not taking: Reported on 7/18/2023), Disp: 11.1 mL, Rfl: 0  •  hydrocortisone 1 % cream, Apply topically in the morning   (Patient not taking: Reported on 7/18/2023), Disp: , Rfl:   •  hydrOXYzine pamoate (VISTARIL) 25 mg capsule, Take 1 capsule (25 mg total) by mouth daily at bedtime as needed for itching or anxiety (Patient not taking: Reported on 3/3/2023), Disp: 30 capsule, Rfl: 0  •  Lidocaine Viscous HCl (XYLOCAINE) 2 % mucosal solution, Swish and spit 15 mL 4 (four) times a day as needed for mouth pain or discomfort Gargle and spit 15mL 4x a day as needed for sore throat (Patient not taking: Reported on 7/18/2023), Disp: 100 mL, Rfl: 0    Current Allergies     Allergies as of 07/18/2023 - Reviewed 07/18/2023   Allergen Reaction Noted   • Ciprofloxacin GI Intolerance 09/18/2018            The following portions of the patient's history were reviewed and updated as appropriate: allergies, current medications, past family history, past medical history, past social history, past surgical history and problem list.     Past Medical History:   Diagnosis Date   • Anxiety    • Dry eye syndrome of both eyes    • Eczema        Past Surgical History:   Procedure Laterality Date   • WRIST SURGERY Left     ganglion cyst       Family History   Problem Relation Age of Onset   • No Known Problems Mother    • No Known Problems Father    • No Known Problems Sister    • No Known Problems Brother    • Ovarian cancer Maternal Grandmother    • Brain cancer Maternal Grandfather    • No Known Problems Paternal Grandmother    • Bone cancer Paternal Grandfather • Ovarian cancer Paternal Aunt          Medications have been verified. Objective   /78   Pulse 84   Temp (!) 96.3 °F (35.7 °C)   Resp 18   SpO2 100%   No LMP for male patient. Physical Exam     Physical Exam  Vitals and nursing note reviewed. Constitutional:       General: He is not in acute distress. Appearance: Normal appearance. He is not ill-appearing. Pulmonary:      Effort: Pulmonary effort is normal.   Skin:     Findings: Erythema and wound present. Neurological:      Mental Status: He is alert and oriented to person, place, and time.    Psychiatric:         Mood and Affect: Mood normal.         Behavior: Behavior normal.

## 2025-05-19 ENCOUNTER — APPOINTMENT (OUTPATIENT)
Dept: RADIOLOGY | Facility: CLINIC | Age: 40
End: 2025-05-19
Payer: COMMERCIAL

## 2025-05-19 DIAGNOSIS — G57.81 NEUROMA OF THIRD INTERSPACE OF RIGHT FOOT: ICD-10-CM

## 2025-05-19 PROCEDURE — 73630 X-RAY EXAM OF FOOT: CPT
